# Patient Record
Sex: FEMALE | Race: WHITE | Employment: OTHER | URBAN - METROPOLITAN AREA
[De-identification: names, ages, dates, MRNs, and addresses within clinical notes are randomized per-mention and may not be internally consistent; named-entity substitution may affect disease eponyms.]

---

## 2024-05-28 NOTE — TELEPHONE ENCOUNTER
Patient calling to initiate prenatal care  LMP 04/18  Patient is 7-8 weeks on 06/13  Confirmation Ultrasound and Appointment scheduled on   Future Appointments   Date Time Provider Department Center   6/18/2024  2:45 PM [facility] [place] [facility] [place]   6/18/2024  3:15 PM Manjit Pabon MD [facility] [place]   7/11/2024 11:30 AM Luiza Marti MD [facility] [place]         Any history of ectopic pregnancy? yes  Any history of miscarriage? no  Any medications that you are taking on a regular basis other than prenatal vitamins? no (if not taking prenatal vitamins, encourage patient to start taking.)  Any bleeding since the first day of last LMP and your positive pregnancy test? no    Insurance [facility]

## 2024-05-29 NOTE — TELEPHONE ENCOUNTER
Called to follow up with patient x2 with Interpretor ID [id number]  No option for voicemail     Patient with hx of ectopic pregnancy. Per Dr. Zhao patient needs appointment asap to establish care with provider team and order HCG testing. Can keep current 7/8 week  appt.     Patient needs MyChart activation and 15-30 min appt to establish care.

## 2024-05-29 NOTE — TELEPHONE ENCOUNTER
Called patient with interpretor ID [id number]    Patient denies hx of ectopic pregnancy. Patient states has a history of two term pregnancies, no hx of miscarriage or ectopic, no previous abnormal pregnancies.     Has started taking her prenatal, and is not currently on any medications.     [employer] activation text sent to patient, verbalizes understanding to setup account.     No further questions or concerns. No changes made to prenatal appointments.

## 2024-05-29 NOTE — TELEPHONE ENCOUNTER
Call back attempted with interpretor ID [id number]  Able to reach patient's spouse, he instructed this RN to try to call patient again in 10-15 minutes.

## 2024-06-18 NOTE — PROGRESS NOTES
[facility]  Obstetrics and Gynecology    Subjective:     Sandra Bryan is a 39 year old  female presents with c/o secondary amenorrhea and positive pregnancy test. The patient was recommended to return for further evaluation. The patient reports doing welle. Patient's last menstrual period was 2024 (exact date)..     Review of Systems:  General:  denies fevers, chills, fatigue and malaise.   Respiratory: denies SOB, dyspnea, cough or wheezing  Cardiovascular:  denies chest pain, palpitations, exercise intolerance   GI: denies abdominal pain, diarrhea, constipation  :  denies dysuria, hematuria, increased urinary frequency.  denies abnormal uterine bleeding or vaginal discharge.     OB History:   OB History    Para Term  AB Living   3 2 2 0 0 2   SAB IAB Ectopic Multiple Live Births   0 0 0 1 2      # Outcome Date GA Lbr Nixon/2nd Weight Sex Type Anes PTL Lv   3 Current            2 Term  41w0d   M Caesarean      1A Para     M Caesarean   ORLY   1B Term  38w0d   F Caesarean   ORLY       Gyne History:  Menarche: 11  Period Cycle (Days): Pregnant  Period Duration (Days): N/A  Period Flow: N/A  Hx Prior Abnormal Pap: No  Pap Date:  (Per pt 3 yrs ago)  Pap Result Notes: WNL  Patient's last menstrual period was 2024 (exact date).      Meds:  Current Outpatient Medications on File Prior to Visit   Medication Sig Dispense Refill    Cholecalciferol (VITAMIN D3) 1.25 MG (86246 UT) Oral Cap TAKE 1 CAPSULE BY MOUTH ONCE PER WEEK FOR 12 WEEKS.      Prenatal Vit-Fe Fumarate-FA (PRENATAL VITAMINS OR) Take by mouth.       No current facility-administered medications on file prior to visit.       All:  No Known Allergies    PMH:  History reviewed. No pertinent past medical history.    PSH:  Past Surgical History:   Procedure Laterality Date          Hand surgery Left        Objective:     Vitals:    24 1514   BP: 116/66   Pulse: 108   Weight: 202 lb 9.6 oz (91.9 kg)         There  is no height or weight on file to calculate BMI.    General: AAO.NAD.   CVS exam: normal peripheral perfusion  Chest: non-labored breathing, no tachypnea   Abdominal exam: deferred   Pelvic exam: deferred        Summary of Ultrasound Findings:  First Trimester US   GA by LMP: 8+5   GA by US: 8+6   FHT: 163 bpm   Impression: Single live IUP. Early viable. Gestational sac, yolk sac and fetal pole seen. Both ovaries wnl. No free fluid. Cardiac activity noted. Cervix appears closed and wnl.   WILFREDO 2025 by LMP c/w today's ultrasound       Assessment:     Sandra Bryan is a 39 year old  female who presents for secondary amenorrhea and positive pregnancy test    Patient Active Problem List    Diagnosis    History of      X2  [ ] repeat       Advanced maternal age in multigravida (HCC)     [ ] MFM consult  [ ] NIPT             Plan:     Secondary amenorrhea  - a/w positive pregnancy test  - US noted for  viable IUP at 8+5   - Patient counseled regarding fish consumption, avoidance of exposure to cat feces/raw meat/raw seafood/unpasteurized dairy/unheated lunch meat. Discussed coffee consumption of up to 200mg of caffeine. Recommended avoidance of alcohol, tobacco, drugs and high risk sexual partners.   - Counseled on taking a PNV with at least 0.4mg of folic acid   - genetic screening testing d/w patient and family, pt declines invasive diagnostic testing and considering first versus second trimester screening   - advised to follow up to establish prenatal care   - SAB precautions provided   - d/w nausea and vomiting in pregnancy including vitamin B 6 and unisom   - COVID 19 precautions provided, recommend vaccination and booster if/when applicable     All of the findings and plan were discussed with the patient.  She notes understanding and agrees with the plan of care.  All questions were answered to the best of my ability at this time.      Total patient time was 15 minutes in evaluation,  consultation, and coordination of care. This included face to face and non-face to face actions. The patient's questions and concerns that were addressed.     RTC in 4 weeks or sooner if needed     Manjit Pabon MD      Discussed with patient that there will not be further notification of normal or benign results other than receiving results on mychart. A [employer] message or telephone call will be placed by the physician and/or office staff if results are abnormal.         Note to patient and family   The 21st Century Cures Act makes medical notes available to patients in the interest of transparency.  However, please be advised that this is a medical document.  It is intended as [id number] communication.  It is written and medical language may contain abbreviations or verbiage that are technical and unfamiliar.  It may appear blunt or direct.  Medical documents are intended to carry relevant information, facts as evident, and the clinical opinion of the practitioner.      This note could include assistance by Dragon voice recognition. Errors in content may be related to improper recognition by the system; efforts to review and correct have been done but errors may still exist.

## 2024-06-19 PROBLEM — Z98.891 HISTORY OF C-SECTION: Status: ACTIVE | Noted: 2024-06-19

## 2024-06-19 PROBLEM — O09.529 ADVANCED MATERNAL AGE IN MULTIGRAVIDA (HCC): Status: ACTIVE | Noted: 2024-06-19

## 2024-07-08 NOTE — TELEPHONE ENCOUNTER
Patient's spouse, Vel, called in regarding order that dr gave patient for DNA testing and other exams that patient wanted to have performed.  The spouse called insurance to see if they would cover the testing and exams and was advised that CPT codes are needed.  Please contact him back to advise-advised spouse to have patient update verbal consent on MY Chart to have info released to him, or we can call patient at [phone] to advise, but patient will need .

## 2024-07-09 NOTE — TELEPHONE ENCOUNTER
Spoke to Vel with patient also present on phone call.  Panorama cell free DNA test desired but Vel would like to confirm insurance coverage.  Provided Vel with [employer] phone number, [phone], to discuss cost estimate and coverage options for Panorama Base Panel.  Explained that Debra will check insurance coverage and offer a discounted cash price if insurance will not cover or will be more expensive.  Instructed Vel to call back if the testing is desired so that a nurse can place the orders and have the kit shipped to their home.  Understanding was verbalized.

## 2024-07-15 NOTE — TELEPHONE ENCOUNTER
WILFREDO 01/23/2025 - currently 12w4d   Patient desires NIPT testing- would like gender reported.    Order placed for [facility].    Patient aware that Debra will send her a link to schedule mobile phlebotomy and ship a kit to her home.    Order Number: [id number]

## 2024-07-17 PROBLEM — O99.210 OBESITY AFFECTING PREGNANCY, ANTEPARTUM (HCC): Status: ACTIVE | Noted: 2024-07-17

## 2024-07-17 PROBLEM — Z34.80 SUPERVISION OF OTHER NORMAL PREGNANCY, ANTEPARTUM (HCC): Status: ACTIVE | Noted: 2024-07-17

## 2024-07-17 PROCEDURE — 87624 HPV HI-RISK TYP POOLED RSLT: CPT | Performed by: OBSTETRICS & GYNECOLOGY

## 2024-07-17 NOTE — PROGRESS NOTES
[facility]  Obstetrics and Gynecology  History & Physical    CC: Patient is here to establish prenatal care     Subjective:     HPI:  Sandra Bryan is a 39 year old  female at 12w6d who presents today to establish prenatal care. Patient reports doing well. Denies vaginal bleeding, abdominal/pelvic pain, nausea and vomiting.     [employer] interpretor: ID [id number] - used for entire visit     LMP: Patient's last menstrual period was 2024 (exact date).  WILFREDO:  Estimated Date of Delivery: 25    OB:  OB History    Para Term  AB Living   3 2 2 0 0 2   SAB IAB Ectopic Multiple Live Births   0 0 0 1 2      # Outcome Date GA Lbr Nixon/2nd Weight Sex Type Anes PTL Lv   3 Current            2 Term  41w0d   M Caesarean      1A Para     M Caesarean   ORLY   1B Term  38w0d   F Caesarean   ORLY         Pregnancy planned?: yes  Pregnancy desired? yes    GYN:   Menarche: 11  Period Cycle (Days): Pregnant  Period Duration (Days): N/A  Period Flow: N/A  Hx Prior Abnormal Pap: No  Pap Date:  (Per pt last pap was in )  Pap Result Notes: WNL    STD hx- denies.  Pap hx- denies abnormal.     PMH: History reviewed. No pertinent past medical history.    PSH:    Past Surgical History:   Procedure Laterality Date          Hand surgery Left        MEDS:  Current Outpatient Medications on File Prior to Visit   Medication Sig Dispense Refill    Cholecalciferol (VITAMIN D3) 1.25 MG (41455 UT) Oral Cap TAKE 1 CAPSULE BY MOUTH ONCE PER WEEK FOR 12 WEEKS.      Prenatal Vit-Fe Fumarate-FA (PRENATAL VITAMINS OR) Take by mouth.       No current facility-administered medications on file prior to visit.       Allergies:    No Known Allergies  denies latex.     Immunizations:    There is no immunization history on file for this patient.    Family Hx:    History reviewed. No pertinent family history.    SocialHx:        Social History     Socioeconomic History    Marital status:    Tobacco Use    Smoking  status: Never    Smokeless tobacco: Never   Vaping Use    Vaping status: Never Used   Substance and Sexual Activity    Alcohol use: Not Currently    Drug use: Never    Sexual activity: Yes   Other Topics Concern    Caffeine Concern No    Exercise No    Seat Belt Yes     Social Determinants of Health     Financial Resource Strain: Low Risk  (3/7/2024)    Received from [facility]    Overall Financial Resource Strain (CARDIA)     Difficulty of Paying Living Expenses: Not hard at all   Food Insecurity: No Food Insecurity (3/7/2024)    Received from [facility]    Hunger Vital Sign     Worried About Running Out of Food in the Last Year: Never true     Ran Out of Food in the Last Year: Never true   Transportation Needs: No Transportation Needs (3/7/2024)    Received from [facility]    PRAPARE - Transportation     Lack of Transportation (Medical): No     Lack of Transportation (Non-Medical): No   Stress: Stress Concern Present (3/7/2024)    Received from [facility]    [employer] [place] [employer] - Occupational Stress Questionnaire     Feeling of Stress : Very much   Housing Stability: Low Risk  (3/7/2024)    Received from [facility]    Housing Stability Vital Sign     Unable to Pay for Housing in the Last Year: No     Number of Places Lived in the Last Year: 1     Unstable Housing in the Last Year: No       Patient feels unsafe or threatened?: denies       Health maintenance:  Mammogram (age 40 and q1-2yr):  N/A  Colonoscopy (age 50 and q10yr):  N/A    Review of Systems:  General: no complaints per category. See HPI for additional information.   Breast: no complaints per category. See HPI for additional information.   Respiratory: no complaints per category. See HPI for additional information.   Cardiovascular: no complaints per category. See HPI for additional information.   GI: no complaints  per category. See HPI for additional information.   : no complaints per category. See HPI for additional information.   Heme: no complaints per category. See HPI for additional information.     Objective:     PE:  Vitals: /70   Pulse 101   Ht 63\"   Wt 200 lb (90.7 kg)   LMP 2024 (Exact Date)   BMI 35.43 kg/m²   Gen: A/O, NAD  Head/Neck: neck supple, thyroid non-enlarged, symmetric and without nodules  Breasts: breasts symmetric, no dominant or suspicious mass, no skin or nipple changes and no axillary adenopathy  CV: normal peripheral perfusion   Lungs: no tachypnea, non-labored breathing   Abdominal exam: soft, nontender, nondistended  Ext: non-tender, no edema  :  1) External Genitalia -  Normal appearing skin and hair distribution, no visible lesions.   2) Vagina:  mucosa appears pink, and well rugated.   3) Cervix:  smooth, with no visible lesions, erosions, or scars, os closed. No bleeding noted.   4) Uterus: Anteverted. Mobile. 12 week gestational size.   5) Adnexa/parametria: Non tender adnexa. No masses.     Fetal Well Being Assessment:     BPM    Assessment/Plan:     Sandra Bryan is a 39 year old  female at 12w6d who presents today to establish prenatal care.    Patient Active Problem List    Diagnosis    Supervision of other normal pregnancy, antepartum (HCC)    Obesity affecting pregnancy, antepartum (HCC)     [ ] early 1 hr GTT       History of      X2  [ ] repeat       Advanced maternal age in multigravida (HCC)     Low dose ASA  [ ] MFM consult  [ ] NIPT           Prenatal care  - prenatal labs ordered  - Pap:  collected and ordered   - Cervical cultures: GC, Chl collected and ordered   - continue PNV daily given        Genetic Screening tests  - Discussion held with patient about genetic screening. Discussion including first trimester versus second trimester screening. Patient offered Amniocentesis and declined. Pt requested NIPT and ordered. Patient  has kit today.   - Cystic fibrosis/SMA/hemoglobinopathy carrier screening: offered and patient will consider, advised to notify office if she desires order        Patient education  - Pt counseled on safety, diet, exercise, caffiene, tobacco, ETOH, sexual activity, ER precautions, diet, exercise, appropriate weight gain, travel, appropriate otc medication use, substance abuse and pets.  - Counseled on taking a PNV with 0.4mg of folic acid   - Limiting intake of alcohol, coffee, tea, soda, and other foods containing caffeine  - Limit intake of fish to twice weekly to limit mercury exposure  - COVID 19 precautions provided    - Pregnancy BMI guidelines: Prepregnancy weight: 202 lbs. Weight today 200 lbs. BMI 35. Expected Weigh gain 11-20 lbs. TWG -2 lbs.          RTC in 4 wks     Shari Zhao MD   [facility] - OBGYN    Discussed with patient that there will not be further notification of normal or benign results other than receiving results on [employer]IV[employer]. A [employer] message or telephone call will be placed by the physician and/or office staff if results are abnormal.     Note to patient and family   The 21st Century Cures Act makes medical notes available to patients in the interest of transparency.  However, please be advised that this is a medical document.  It is intended as [id number]-qq-[id number] communication.  It is written and medical language may contain abbreviations or verbiage that are technical and unfamiliar.  It may appear blunt or direct.  Medical documents are intended to carry relevant information, facts as evident, and the clinical opinion of the practitioner.

## 2024-07-19 PROBLEM — O23.40 URINARY TRACT INFECTION IN MOTHER DURING PREGNANCY, ANTEPARTUM (HCC): Status: ACTIVE | Noted: 2024-07-19

## 2024-07-22 NOTE — TELEPHONE ENCOUNTER
From: Sandra Bryan  To: Shari Zhao  Sent: 7/22/2024 8:21 AM CDT  Subject: Ultrasound     Hi Dr. Zhao, I hope that you are fine.    I have a question about the ultrasound, my first ultrasound was with 9 weeks and the next will be with 20 (high risk). Can you please confirm if should I schedule any other in this meantime?     Thanks

## 2024-07-22 NOTE — PROGRESS NOTES
Called and no  answer, was unable to leave a vm for pt. , vm not set up either will try later.  Looks like needs a Filipino

## 2024-07-27 ENCOUNTER — LAB ENCOUNTER (OUTPATIENT)
Dept: LAB | Facility: HOSPITAL | Age: 39
End: 2024-07-27
Attending: OBSTETRICS & GYNECOLOGY
Payer: COMMERCIAL

## 2024-07-27 DIAGNOSIS — O99.210 OBESITY AFFECTING PREGNANCY, ANTEPARTUM, UNSPECIFIED OBESITY TYPE (HCC): ICD-10-CM

## 2024-07-27 DIAGNOSIS — Z34.80 SUPERVISION OF OTHER NORMAL PREGNANCY, ANTEPARTUM (HCC): ICD-10-CM

## 2024-07-27 LAB
ANTIBODY SCREEN: NEGATIVE
BASOPHILS # BLD AUTO: 0.02 X10(3) UL (ref 0–0.2)
BASOPHILS NFR BLD AUTO: 0.2 %
EOSINOPHIL # BLD AUTO: 0.15 X10(3) UL (ref 0–0.7)
EOSINOPHIL NFR BLD AUTO: 1.2 %
ERYTHROCYTE [DISTWIDTH] IN BLOOD BY AUTOMATED COUNT: 14 %
GLUCOSE 1H P GLC SERPL-MCNC: 150 MG/DL
HBV SURFACE AG SER-ACNC: 0.18 [IU]/L
HBV SURFACE AG SERPL QL IA: NONREACTIVE
HCT VFR BLD AUTO: 35.2 %
HCV AB SERPL QL IA: NONREACTIVE
HGB BLD-MCNC: 12 G/DL
IMM GRANULOCYTES # BLD AUTO: 0.1 X10(3) UL (ref 0–1)
IMM GRANULOCYTES NFR BLD: 0.8 %
LYMPHOCYTES # BLD AUTO: 2.06 X10(3) UL (ref 1–4)
LYMPHOCYTES NFR BLD AUTO: 16.4 %
MCH RBC QN AUTO: 27.2 PG (ref 26–34)
MCHC RBC AUTO-ENTMCNC: 34.1 G/DL (ref 31–37)
MCV RBC AUTO: 79.8 FL
MONOCYTES # BLD AUTO: 0.51 X10(3) UL (ref 0.1–1)
MONOCYTES NFR BLD AUTO: 4.1 %
NEUTROPHILS # BLD AUTO: 9.74 X10 (3) UL (ref 1.5–7.7)
NEUTROPHILS # BLD AUTO: 9.74 X10(3) UL (ref 1.5–7.7)
NEUTROPHILS NFR BLD AUTO: 77.3 %
PLATELET # BLD AUTO: 285 10(3)UL (ref 150–450)
RBC # BLD AUTO: 4.41 X10(6)UL
RH BLOOD TYPE: POSITIVE
RUBV IGG SER QL: POSITIVE
RUBV IGG SER-ACNC: 36 IU/ML (ref 10–?)
T PALLIDUM AB SER QL IA: NONREACTIVE
WBC # BLD AUTO: 12.6 X10(3) UL (ref 4–11)

## 2024-07-27 PROCEDURE — 87389 HIV-1 AG W/HIV-1&-2 AB AG IA: CPT

## 2024-07-27 PROCEDURE — 85025 COMPLETE CBC W/AUTO DIFF WBC: CPT

## 2024-07-27 PROCEDURE — 86850 RBC ANTIBODY SCREEN: CPT

## 2024-07-27 PROCEDURE — 82950 GLUCOSE TEST: CPT

## 2024-07-27 PROCEDURE — 86901 BLOOD TYPING SEROLOGIC RH(D): CPT

## 2024-07-27 PROCEDURE — 86803 HEPATITIS C AB TEST: CPT

## 2024-07-27 PROCEDURE — 36415 COLL VENOUS BLD VENIPUNCTURE: CPT

## 2024-07-27 PROCEDURE — 86900 BLOOD TYPING SEROLOGIC ABO: CPT

## 2024-07-27 PROCEDURE — 86762 RUBELLA ANTIBODY: CPT

## 2024-07-27 PROCEDURE — 87340 HEPATITIS B SURFACE AG IA: CPT

## 2024-07-27 PROCEDURE — 86780 TREPONEMA PALLIDUM: CPT

## 2024-07-29 DIAGNOSIS — O99.810 ABNORMAL MATERNAL GLUCOSE TOLERANCE, ANTEPARTUM (HCC): Primary | ICD-10-CM

## 2024-07-31 NOTE — PROGRESS NOTES
Patient returned call to office.   Discussed provider review and recommendations.   Instructions for 3 hour glucose testing provided.     All questions answered.   Follow up MyChart sent.

## 2024-07-31 NOTE — PROGRESS NOTES
Called patient x2 with interpretor ID [id number]  No answer, no option for voicemail.     [employer] message sent to patient.

## 2024-08-03 ENCOUNTER — LABORATORY ENCOUNTER (OUTPATIENT)
Dept: LAB | Facility: HOSPITAL | Age: 39
End: 2024-08-03
Attending: OBSTETRICS & GYNECOLOGY
Payer: COMMERCIAL

## 2024-08-03 DIAGNOSIS — O99.810 ABNORMAL MATERNAL GLUCOSE TOLERANCE, ANTEPARTUM (HCC): ICD-10-CM

## 2024-08-03 LAB
GLUCOSE 1H P GLC SERPL-MCNC: 145 MG/DL
GLUCOSE 2H P GLC SERPL-MCNC: 128 MG/DL
GLUCOSE 3H P GLC SERPL-MCNC: 98 MG/DL (ref 70–140)
GLUCOSE P FAST SERPL-MCNC: 84 MG/DL

## 2024-08-03 PROCEDURE — 82951 GLUCOSE TOLERANCE TEST (GTT): CPT

## 2024-08-03 PROCEDURE — 82952 GTT-ADDED SAMPLES: CPT

## 2024-08-03 PROCEDURE — 36415 COLL VENOUS BLD VENIPUNCTURE: CPT

## 2024-08-05 ENCOUNTER — TELEPHONE (OUTPATIENT)
Dept: OBGYN CLINIC | Facility: CLINIC | Age: 39
End: 2024-08-05

## 2024-08-05 DIAGNOSIS — Z13.79 ENCOUNTER FOR GENETIC SCREENING: ICD-10-CM

## 2024-08-05 NOTE — TELEPHONE ENCOUNTER
Received test results from [facility]. Placed in nurse bin in [place] for review

## 2024-08-08 ENCOUNTER — PATIENT MESSAGE (OUTPATIENT)
Dept: OBGYN CLINIC | Facility: CLINIC | Age: 39
End: 2024-08-08

## 2024-08-08 NOTE — TELEPHONE ENCOUNTER
From: Sandra Omer  To: Manjit Pabon  Sent: 8/8/2024 11:22 AM CDT  Subject: Back and forth pain    Hi doctor,     How are you doing?   I have an appointment for August 19, but I have some pain near the baby and comes and go frequently, I’d like to know if it is possible to anticipate the appointment so you can give me some instructions or check why I’m feeling it     Thanks

## 2024-08-08 NOTE — TELEPHONE ENCOUNTER
Called to follow up with patient.   Patient is currently 16w0d with Estimated Date of Delivery: 1/23/25     Pain is on the lower middle abdomen, under belly button. Patient endorses painful urination, no other symptoms present.     Patient did previously have a UTI that was treated with cefpodoxime, states that symptoms did get better temporarily.     Routed to provider for review.

## 2024-08-09 NOTE — TELEPHONE ENCOUNTER
Per Dr. Pabon- patient can go to the lab to complete urine culture and then start on antibiotic.  Provider has placed orders.  Patient notified by MyChart.

## 2024-08-10 ENCOUNTER — LAB ENCOUNTER (OUTPATIENT)
Dept: LAB | Age: 39
End: 2024-08-10
Attending: STUDENT IN AN ORGANIZED HEALTH CARE EDUCATION/TRAINING PROGRAM
Payer: COMMERCIAL

## 2024-08-10 DIAGNOSIS — N30.90 CYSTITIS: ICD-10-CM

## 2024-08-10 PROCEDURE — 87086 URINE CULTURE/COLONY COUNT: CPT

## 2024-08-19 ENCOUNTER — ROUTINE PRENATAL (OUTPATIENT)
Dept: OBGYN CLINIC | Facility: CLINIC | Age: 39
End: 2024-08-19
Payer: COMMERCIAL

## 2024-08-19 VITALS
SYSTOLIC BLOOD PRESSURE: 112 MMHG | HEART RATE: 116 BPM | DIASTOLIC BLOOD PRESSURE: 68 MMHG | BODY MASS INDEX: 36 KG/M2 | WEIGHT: 200.81 LBS

## 2024-08-19 DIAGNOSIS — Z36.8A ENCOUNTER FOR ANTENATAL SCREENING FOR OTHER GENETIC DEFECT (HCC): ICD-10-CM

## 2024-08-19 DIAGNOSIS — O09.529 ANTEPARTUM MULTIGRAVIDA OF ADVANCED MATERNAL AGE (HCC): Primary | ICD-10-CM

## 2024-08-19 DIAGNOSIS — Z34.80 SUPERVISION OF OTHER NORMAL PREGNANCY, ANTEPARTUM (HCC): ICD-10-CM

## 2024-08-19 NOTE — PROGRESS NOTES
KULWINDER  The  service was used for the duration of the visit  FM- none yet  Doing well, no concerns or questions  No complaints. No LOF/VB/uctx  Genetic testing done, she would like the MSAFP and is aware to complete by 21 weeks  Anatomy Scan with MFM at 20 weeks  KULWINDER 4 weeks

## 2024-08-29 NOTE — PROGRESS NOTES
Outpatient Maternal-Fetal Medicine Consultation    Dear Dr. Zhao    Thank you for requesting ultrasound evaluation and maternal fetal medicine consultation on your patient Sandra Omer.  As you are aware she is a 39 year old female  with a singletonpregnancy and an Estimated Date of Delivery: 25.  A maternal-fetal medicine consultation was requested secondary to advanced maternal age.  Her prenatal records and labs were reviewed.    HISTORY  # 1A - Date: None, Sex: Male, Weight: None, GA: None, Type: Caesarean Section, Apgar1: None, Apgar5: None, Living: Living, Birth Comments: None  # 1B - Date: None, Sex: Female, Weight: None, GA: 38w0d, Type: Caesarean Section, Apgar1: None, Apgar5: None, Living: Living, Birth Comments: None    # 2 - Date: None, Sex: Male, Weight: None, GA: 41w0d, Type: Caesarean Section, Apgar1: None, Apgar5: None, Living: None, Birth Comments: None    # 3 - Date: None, Sex: None, Weight: None, GA: None, Type: None, Apgar1: None, Apgar5: None, Living: None, Birth Comments: None    Past Medical History  The patient  has no past medical history on file.    Past Surgical History  The patient  has a past surgical history that includes Hand Surgery (Left) and .    Family History  The patient has no family status information on file.       Medications:   Current Outpatient Medications:     Cholecalciferol (VITAMIN D3) 1.25 MG (81849 UT) Oral Cap, TAKE 1 CAPSULE BY MOUTH ONCE PER WEEK FOR 12 WEEKS., Disp: , Rfl:     Prenatal Vit-Fe Fumarate-FA (PRENATAL VITAMINS OR), Take by mouth., Disp: , Rfl:     cefpodoxime 100 MG Oral Tab, Take 1 tablet (100 mg total) by mouth 2 (two) times daily. (Patient not taking: Reported on 2024), Disp: 10 tablet, Rfl: 0  Allergies: No Known Allergies    PHYSICAL EXAMINATION:  /77 (BP Location: Right arm, Patient Position: Sitting, Cuff Size: adult)   Pulse 100   Ht 5' 3\" (1.6 m)   Wt 200 lb (90.7 kg)   LMP 2024 (Exact  Date)   BMI 35.43 kg/m²   General: alert and oriented,no acute distress  Abdomen: gravid, soft, non-tender  Extremities: non-tender, no edema    OBSTETRIC ULTRASOUND  The patient had a level II ultrasound today which revealed size consistent with dates and a normal detailed anatomic survey.      Ultrasound Findings:  Single IUP in breech presentation.    Placenta is posterior.   A 3 vessel cord is noted.  Cardiac activity is present at 159 bpm   g ( 0 lb 13 oz);   MVP is 4.8 cm .     The fetal measurements are consistent with the established EDC. No ultrasound evidence of structural abnormalities are seen today. The nasal bone is present. No ultrasound evidence of markers for aneuploidy are seen. She understands that ultrasound exam cannot exclude genetic abnormalities and that genetic testing is recommended. The limitations of ultrasound were discussed.     Uterus and adnexa appeared normal  today on US    See PACS/Imaging Tab For Complete Ultrasound Report  I interpreted the results and reviewed them with the patient.    DISCUSSION  During her visit we discussed and reviewed the following issues:  OBESITY  This patient has obesity; her pre-gravid BMI is: 35.8  Obesity during pregnancy is associated with numerous maternal and  risks.  It is not clear whether obesity is a direct cause of adverse pregnancy outcome or whether the association between obesity and adverse pregnancy outcome is due to factors such as diabetes mellitus.   Data suggest that obese women should be encouraged to undertake a weight reduction program (diet, exercise, behavior modification, and possibly bariatric surgery in some cases) prior to attempting to conceive.            Subfertility in obese women is most commonly related to ovulatory dysfunction, and, in some obese women, the ovulatory dysfunction is related to polycystic ovary syndrome (PCOS). It is also important to note that even among ovulatory women, increasing  obesity is associated with decreasing spontaneous pregnancy rates.  The increased risk of miscarriage in obese women may be because such women often have PCOS or isolated insulin resistance.                 Due to its strong association with obesity in the general population, type 2 diabetes mellitus is one of the two most common medical complications of the obese . The increased risk of type 2 diabetes is primarily related to an exaggerated increase in insulin resistance in the obese state. It is reasonable to screen obese gravidas for undiagnosed pregestational diabetes in the first trimester.   Glucose intolerance associated with gestational diabetes generally resolves postpartum; however, obese women with a history of gestational diabetes have a two-fold increased prevalence of subsequent type 2 diabetes.           An association between obesity and hypertensive disorders during pregnancy has been consistently reported.  In particular, maternal weight and BMI are independent risk factors for preeclampsia.             Studies have found that the increased risk of  birth in obese gravidas is primarily associated with obesity-related medical and  complications, rather than an intrinsic predisposition to spontaneous  birth. Prevention of  birth in these patients, therefore, should be directed toward prevention or management of medical and obstetrical complications.               Both prepregnancy obesity and excessive maternal weight gain before or during pregnancy contribute to an increased probability of  delivery.  It has also been hypothesized that obesity may lead to dystocia due to increased soft tissue deposition in the maternal pelvis.    delivery in the obese  is associated with numerous perioperative concerns, including emergency delivery, prolonged incision to delivery interval, blood loss >1000 mL, longer operative times, wound infection,  thromboembolism, and endometritis.            Maternal obesity appears to be associated with a small increase in the absolute rate of some congenital anomalies, and the risk may increase with increasing maternal weight.  The risk of neural tube defects increased significantly with maternal weight.    The analysis found that overweight and obese pregnant women experienced significantly more stillbirths than normal weight women.      Increased  testing and Level 2 Ultrasound is recommended.     ADVANCED MATERNAL AGE    Background  I reviewed with the patient that pregnancies in women of advanced maternal age (35 or older at delivery) are associated with elevated risks.  Specifically, there is a higher rate of:    Fetal malformations  Preeclampsia  Gestational diabetes  Intrauterine fetal death    As a result, enhanced pregnancy surveillance is advised for these patients including a comprehensive ultrasound to assess for fetal malformations  (at 20 weeks) and a third trimester ultrasound assessment for fetal growth (at 32 weeks).  In addition, weekly NST's (initiating at 36 weeks gestation for women 35-39 years and at 32 weeks gestation for women 40 years and older) are also advised.  Routine obstetric care is more than adequate to assess for gestational diabetes and preeclampsia; hence, no further significant alterations in obstetric care are advised.    Fetal Aneuploidy    We also discussed the increased risk of chromosomal abnormalities associated with advanced maternal age.  I reviewed that an ultrasound examination cannot reliably exclude potential genetic abnormalities. Given that the patient will be 39 years old at the time of delivery I reviewed that her risk (at delivery) of having a  with any chromosome abnormality is 1:83 and with trisomy 21 is 1: 136.    Invasive Testing    I offered invasive genetic testing (amniocentesis, chorionic villus sampling) after reviewing the diagnostic accuracy  of these tests as well as the procedure associated loss rate (1:500 for genetic amniocentesis).    She ultimately does not desire invasive genetic testing.     Non-invasive Pregnancy Testing (NIPT)    I reviewed current non-invasive screening options.  Currently non-invasive pregnancy testing (NIPT) offers the highest detection rate (with the lowest false positive rate) for the detection of fetal aneuploidy amongst high-risk patients.  The limitations of detailed mid-trimester sonography was reviewed with the patient.  First trimester screening and second trimester multiple-marker serum serum screening as alternative aneuploidy screening options were also reviewed.  However, both of these tests are associated with lower detection and higher false positive rates.    The patient has already obtained a low-risk  NPIT result and was appropriately reassured.     IMPRESSION:  IUP at 20w0d  AMA: low-risk cell free fetal DNA, declined invasive testing  Obesity    RECOMMENDATIONS:  Continue care with Dr. Zhao  Follow-up growth & BPP ultrasound at 32 weeks gestation  Weekly NST's at 36 weeks.  Limit weight gain to 11-20 pounds.    Thank you for allowing me to participate in the care of your patient.  Please do not hesitate to contact me if additional questions or concerns arise.      Costa Caceres M.D.    40 minutes spent in review of records, patient consultation, documentation and coordination of care.  The relevant clinical matter(s) are summarized above.     Note to patient and family  The 21st Century Cures Act makes medical notes available to patients in the interest of transparency.  However, please be advised that this is a medical document.  It is intended as mjtp-ee-qzgt communication.  It is written and medical language may contain abbreviations or verbiage that are technical and unfamiliar.  It may appear blunt or direct.  Medical documents are intended to carry relevant information, facts as evident, and the  clinical opinion of the practitioner.

## 2024-09-05 ENCOUNTER — OFFICE VISIT (OUTPATIENT)
Dept: PERINATAL CARE | Facility: HOSPITAL | Age: 39
End: 2024-09-05
Attending: OBSTETRICS & GYNECOLOGY
Payer: COMMERCIAL

## 2024-09-05 VITALS
HEART RATE: 100 BPM | HEIGHT: 63 IN | DIASTOLIC BLOOD PRESSURE: 77 MMHG | WEIGHT: 200 LBS | SYSTOLIC BLOOD PRESSURE: 120 MMHG | BODY MASS INDEX: 35.44 KG/M2

## 2024-09-05 DIAGNOSIS — O09.529 ANTEPARTUM MULTIGRAVIDA OF ADVANCED MATERNAL AGE (HCC): ICD-10-CM

## 2024-09-05 DIAGNOSIS — O99.212 OTHER OBESITY AFFECTING PREGNANCY IN SECOND TRIMESTER (HCC): ICD-10-CM

## 2024-09-05 DIAGNOSIS — O09.529 ADVANCED MATERNAL AGE IN MULTIGRAVIDA (HCC): ICD-10-CM

## 2024-09-05 DIAGNOSIS — O09.529 ADVANCED MATERNAL AGE IN MULTIGRAVIDA (HCC): Primary | ICD-10-CM

## 2024-09-05 DIAGNOSIS — E66.8 OTHER OBESITY AFFECTING PREGNANCY IN SECOND TRIMESTER (HCC): ICD-10-CM

## 2024-09-05 PROBLEM — O99.210 OBESITY COMPLICATING PREGNANCY (HCC): Status: ACTIVE | Noted: 2024-07-17

## 2024-09-05 PROCEDURE — 76811 OB US DETAILED SNGL FETUS: CPT | Performed by: OBSTETRICS & GYNECOLOGY

## 2024-09-16 ENCOUNTER — LAB ENCOUNTER (OUTPATIENT)
Dept: LAB | Facility: HOSPITAL | Age: 39
End: 2024-09-16
Attending: NURSE PRACTITIONER
Payer: COMMERCIAL

## 2024-09-16 ENCOUNTER — ROUTINE PRENATAL (OUTPATIENT)
Dept: OBGYN CLINIC | Facility: CLINIC | Age: 39
End: 2024-09-16
Payer: COMMERCIAL

## 2024-09-16 VITALS
DIASTOLIC BLOOD PRESSURE: 72 MMHG | SYSTOLIC BLOOD PRESSURE: 114 MMHG | HEART RATE: 118 BPM | WEIGHT: 203.63 LBS | BODY MASS INDEX: 36 KG/M2

## 2024-09-16 DIAGNOSIS — Z34.80 SUPERVISION OF OTHER NORMAL PREGNANCY, ANTEPARTUM (HCC): Primary | ICD-10-CM

## 2024-09-16 DIAGNOSIS — Z36.8A ENCOUNTER FOR ANTENATAL SCREENING FOR OTHER GENETIC DEFECT (HCC): ICD-10-CM

## 2024-09-16 PROCEDURE — 82105 ALPHA-FETOPROTEIN SERUM: CPT

## 2024-09-16 PROCEDURE — 36415 COLL VENOUS BLD VENIPUNCTURE: CPT

## 2024-09-16 NOTE — PROGRESS NOTES
KULWINDER  Lithuanian  was used for the duration of the visit  Doing well, no concerns or questions  Had a normal Level 2 US with MFM  FM is good  RH positive  1 hr glucose/ CBC ordered- discussed 24-28 weeks  She will do the MS AFP today  RTC 4 wks

## 2024-09-19 LAB
AFP MOM: 1.74
AFP VALUE: 55 NG/ML
GA ON COLL DATE: 17 WEEKS
INSULIN DEP AFP: NO
MAT AGE AT EDD: 40 YR
MULTIPLE GEST AFP: NO
OSBR RISK 1 IN AFP: 1477
WEIGHT AFP: 200 LBS

## 2024-09-19 NOTE — PROGRESS NOTES
Called to follow up with patient using Lao interpretor ID [id number]  No answer, no option for voicemail.     Spouse only approved for general information on verbal release.   Will attempt call back at a later time.

## 2024-09-23 NOTE — PROGRESS NOTES
Called and spoke with pt. But does not understand English, but she wanted her Son Mychal to interpret for her .with her permission.  Reviewed test results with pt. and / son verbalizes understanding of information,

## 2024-10-12 ENCOUNTER — LAB ENCOUNTER (OUTPATIENT)
Dept: LAB | Facility: REFERENCE LAB | Age: 39
End: 2024-10-12
Attending: NURSE PRACTITIONER
Payer: COMMERCIAL

## 2024-10-12 DIAGNOSIS — Z34.80 SUPERVISION OF OTHER NORMAL PREGNANCY, ANTEPARTUM (HCC): ICD-10-CM

## 2024-10-12 LAB — GLUCOSE 1H P GLC SERPL-MCNC: 176 MG/DL

## 2024-10-12 PROCEDURE — 82950 GLUCOSE TEST: CPT

## 2024-10-12 PROCEDURE — 36415 COLL VENOUS BLD VENIPUNCTURE: CPT

## 2024-10-15 ENCOUNTER — ROUTINE PRENATAL (OUTPATIENT)
Dept: OBGYN CLINIC | Facility: CLINIC | Age: 39
End: 2024-10-15
Payer: COMMERCIAL

## 2024-10-15 VITALS
DIASTOLIC BLOOD PRESSURE: 72 MMHG | WEIGHT: 206.19 LBS | SYSTOLIC BLOOD PRESSURE: 112 MMHG | HEART RATE: 99 BPM | BODY MASS INDEX: 36.54 KG/M2 | HEIGHT: 63 IN

## 2024-10-15 DIAGNOSIS — Z3A.25 25 WEEKS GESTATION OF PREGNANCY (HCC): Primary | ICD-10-CM

## 2024-10-15 NOTE — PROGRESS NOTES
Return OB Visit 20-27 WGA      GA: 25w5d  Vitals:    10/15/24 1135   BP: 112/72   Pulse: 99   Weight: 206 lb 3.2 oz (93.5 kg)   Height: 63\"       Doing well.  Denies LOF/VB/uctx. +FM.   RH positive  S/P Anatomy scan   CBC and 3 hr GTT already  ordered      Patient Active Problem List    Diagnosis    Abnormal maternal glucose tolerance, antepartum (HCC)     [X] abnormal early 1 hr GTT  [x ] 3 hr GTT- Nl    Elevated regular 1 hour  on 10/13/2024- 3 hour GTT ordered      Urinary tract infection in mother during pregnancy, antepartum ([facility])     Klebsiella --> oral antibiotics   [x] repeat urine culture was normal      Supervision of other normal pregnancy, antepartum ([facility])    Obesity complicating pregnancy ([facility])     [x] early 1 hr GTT- elevated but normal early 3 hour GTT      History of      X2  [ ] repeat       Advanced maternal age in multigravida (HCC)     Low dose ASA  [ x] MFM consult  Follow-up growth & BPP ultrasound at 32 weeks gestation  Weekly NST's at 36 weeks.  Limit weight gain to 11-20 pounds.  [X ] NIPT wnl            RTC q4wks        Note to patient and family   The  Century Cures Act makes medical notes available to patients in the interest of transparency.  However, please be advised that this is a medical document.  It is intended as xcmp-hh-rzkf communication.  It is written and medical language may contain abbreviations or verbiage that are technical and unfamiliar.  It may appear blunt or direct.  Medical documents are intended to carry relevant information, facts as evident, and the clinical opinion of the practitioner.    Manjit Pabon MD

## 2024-10-15 NOTE — PROGRESS NOTES
Called and spoke with pt. Review test results and recommendations from [facility].  Reviewed instructions for 3 hour GTT and pt. Verbalizes understanding of information and agrees to plan.

## 2024-10-16 ENCOUNTER — TELEPHONE (OUTPATIENT)
Dept: OBGYN CLINIC | Facility: CLINIC | Age: 39
End: 2024-10-16

## 2024-10-16 NOTE — TELEPHONE ENCOUNTER
----- Message from Manjit Pabon sent at 10/15/2024 12:52 PM CDT -----  CS Request: 2nd LANZA please for assist  Request CS at/between this GA: 39 wks  Estimated Date of Delivery: 1/23/25  Indication for CS: repeat  OB history/complications: AMA, obesity, hx of CS x2

## 2024-10-19 ENCOUNTER — LABORATORY ENCOUNTER (OUTPATIENT)
Dept: LAB | Facility: HOSPITAL | Age: 39
End: 2024-10-19
Attending: NURSE PRACTITIONER
Payer: COMMERCIAL

## 2024-10-19 DIAGNOSIS — Z34.80 SUPERVISION OF OTHER NORMAL PREGNANCY, ANTEPARTUM (HCC): ICD-10-CM

## 2024-10-19 DIAGNOSIS — R73.09 ELEVATED GLUCOSE TOLERANCE TEST: ICD-10-CM

## 2024-10-19 LAB
BASOPHILS # BLD AUTO: 0.03 X10(3) UL (ref 0–0.2)
BASOPHILS NFR BLD AUTO: 0.2 %
EOSINOPHIL # BLD AUTO: 0.14 X10(3) UL (ref 0–0.7)
EOSINOPHIL NFR BLD AUTO: 1.1 %
ERYTHROCYTE [DISTWIDTH] IN BLOOD BY AUTOMATED COUNT: 14.1 %
GLUCOSE 1H P GLC SERPL-MCNC: 181 MG/DL
GLUCOSE 2H P GLC SERPL-MCNC: 125 MG/DL
GLUCOSE 3H P GLC SERPL-MCNC: 135 MG/DL (ref 70–140)
GLUCOSE P FAST SERPL-MCNC: 96 MG/DL
HCT VFR BLD AUTO: 35.5 %
HGB BLD-MCNC: 11.3 G/DL
IMM GRANULOCYTES # BLD AUTO: 0.07 X10(3) UL (ref 0–1)
IMM GRANULOCYTES NFR BLD: 0.6 %
LYMPHOCYTES # BLD AUTO: 1.93 X10(3) UL (ref 1–4)
LYMPHOCYTES NFR BLD AUTO: 15.3 %
MCH RBC QN AUTO: 26.5 PG (ref 26–34)
MCHC RBC AUTO-ENTMCNC: 31.8 G/DL (ref 31–37)
MCV RBC AUTO: 83.1 FL
MONOCYTES # BLD AUTO: 0.71 X10(3) UL (ref 0.1–1)
MONOCYTES NFR BLD AUTO: 5.6 %
NEUTROPHILS # BLD AUTO: 9.71 X10 (3) UL (ref 1.5–7.7)
NEUTROPHILS # BLD AUTO: 9.71 X10(3) UL (ref 1.5–7.7)
NEUTROPHILS NFR BLD AUTO: 77.2 %
PLATELET # BLD AUTO: 315 10(3)UL (ref 150–450)
RBC # BLD AUTO: 4.27 X10(6)UL
WBC # BLD AUTO: 12.6 X10(3) UL (ref 4–11)

## 2024-10-19 PROCEDURE — 82951 GLUCOSE TOLERANCE TEST (GTT): CPT

## 2024-10-19 PROCEDURE — 36415 COLL VENOUS BLD VENIPUNCTURE: CPT

## 2024-10-19 PROCEDURE — 82952 GTT-ADDED SAMPLES: CPT

## 2024-10-19 PROCEDURE — 85025 COMPLETE CBC W/AUTO DIFF WBC: CPT

## 2024-10-21 ENCOUNTER — PATIENT MESSAGE (OUTPATIENT)
Dept: OBGYN CLINIC | Facility: CLINIC | Age: 39
End: 2024-10-21

## 2024-10-21 PROBLEM — O24.410 DIET CONTROLLED GESTATIONAL DIABETES MELLITUS (GDM), ANTEPARTUM (HCC): Status: ACTIVE | Noted: 2024-10-21

## 2024-10-22 PROBLEM — O24.415 GESTATIONAL DIABETES MELLITUS (GDM) IN THIRD TRIMESTER CONTROLLED ON ORAL HYPOGLYCEMIC DRUG (HCC): Status: RESOLVED | Noted: 2024-10-22 | Resolved: 2024-10-22

## 2024-10-22 PROBLEM — O24.415 GESTATIONAL DIABETES MELLITUS (GDM) IN THIRD TRIMESTER CONTROLLED ON ORAL HYPOGLYCEMIC DRUG (HCC): Status: ACTIVE | Noted: 2024-10-22

## 2024-10-22 NOTE — PROGRESS NOTES
Called and spoke with pt. and  who interpreted for pt. about needing Diabetes Educator. And that pt. Is GDM according to her recent test results and recommendations from [facility] to see educator.     Pended order for Diabetes Education, please sign pended order

## 2024-10-22 NOTE — TELEPHONE ENCOUNTER
Called and spoke with pt.      GA 26 weeks 5 days  patient complaining of daily headaches for the last week , low BP,  Last OV: 10/15/24 KULWINDER Pabon   Pregnancy Complications: GDM,AMA, Obesity  Abdominal pain: no  Leaking of fluid: no  Vaginal Bleeding: no  Fetal Movement: +      Pt. Said she has had a headache for the lst week, has not tried taking any kind of tylenol for it, said she is taking 2 lose dose ASA daily, did not think she soul take, so encouraged her to take now..along with pushing fluids eating a good meal and taking a nap to see if this helps her at all..started taking bp yesterday because she felt weak and not well.... said they have a machine from [place] and not sure if accurate..looks like it was 105/96..Advised him to get a new one today so pt. Can take her BP's and be confident that readings are correct.  Rating her headaches '4' .  No sick contacts lately, really encouraged her to rest and drink her fluids. And take Tylenol for now.  Any recommendations,  Thanks

## 2024-10-23 NOTE — TELEPHONE ENCOUNTER
Manjit Pabon MD to [facility] Nurse       10/22/24  5:48 PM  Second trimester is when BP reaches a noemi. She should consume electrolyte rich fluid like liquid IV/propel/gatorade (keeping in mind her glucose). She can also take salt tablets over the counter.

## 2024-11-04 ENCOUNTER — ROUTINE PRENATAL (OUTPATIENT)
Dept: OBGYN CLINIC | Facility: CLINIC | Age: 39
End: 2024-11-04
Payer: COMMERCIAL

## 2024-11-04 VITALS
SYSTOLIC BLOOD PRESSURE: 114 MMHG | HEART RATE: 110 BPM | BODY MASS INDEX: 37 KG/M2 | WEIGHT: 207.19 LBS | DIASTOLIC BLOOD PRESSURE: 70 MMHG

## 2024-11-04 DIAGNOSIS — O09.529 ANTEPARTUM MULTIGRAVIDA OF ADVANCED MATERNAL AGE (HCC): ICD-10-CM

## 2024-11-04 DIAGNOSIS — Z34.80 SUPERVISION OF OTHER NORMAL PREGNANCY, ANTEPARTUM (HCC): Primary | ICD-10-CM

## 2024-11-04 DIAGNOSIS — O24.410 DIET CONTROLLED GESTATIONAL DIABETES MELLITUS (GDM), ANTEPARTUM (HCC): ICD-10-CM

## 2024-11-04 DIAGNOSIS — Z23 NEED FOR VACCINATION: ICD-10-CM

## 2024-11-04 PROCEDURE — 90715 TDAP VACCINE 7 YRS/> IM: CPT | Performed by: NURSE PRACTITIONER

## 2024-11-04 PROCEDURE — 90471 IMMUNIZATION ADMIN: CPT | Performed by: NURSE PRACTITIONER

## 2024-11-04 NOTE — PROGRESS NOTES
KULWINDER  Doing well, some low back pain  GOOD FM  Denies VB/LOF/uctx  Rh positive, TDAP received  Dx GDM, has not scheduled appointment with educator. I strongly encouraged they schedule this soon.  RTC in 2 wks  Fetal movement instructions given

## 2024-11-06 ENCOUNTER — TELEPHONE (OUTPATIENT)
Age: 39
End: 2024-11-06

## 2024-11-11 ENCOUNTER — LAB ENCOUNTER (OUTPATIENT)
Dept: LAB | Facility: HOSPITAL | Age: 39
End: 2024-11-11
Attending: NURSE PRACTITIONER
Payer: COMMERCIAL

## 2024-11-11 ENCOUNTER — DIABETIC EDUCATION (OUTPATIENT)
Age: 39
End: 2024-11-11
Payer: COMMERCIAL

## 2024-11-11 DIAGNOSIS — O24.415 GESTATIONAL DIABETES MELLITUS (GDM) IN THIRD TRIMESTER CONTROLLED ON ORAL HYPOGLYCEMIC DRUG (HCC): Primary | ICD-10-CM

## 2024-11-11 DIAGNOSIS — Z34.80 SUPERVISION OF OTHER NORMAL PREGNANCY, ANTEPARTUM (HCC): ICD-10-CM

## 2024-11-11 LAB — T PALLIDUM AB SER QL IA: NONREACTIVE

## 2024-11-11 PROCEDURE — 86780 TREPONEMA PALLIDUM: CPT

## 2024-11-11 PROCEDURE — 36415 COLL VENOUS BLD VENIPUNCTURE: CPT

## 2024-11-11 PROCEDURE — 87389 HIV-1 AG W/HIV-1&-2 AB AG IA: CPT

## 2024-11-11 NOTE — PATIENT INSTRUCTIONS
Blood Glucose Goals    Start checking your blood sugars 4 times/day:  1.) Fasting (before your first meal of the day)  2.) 2 hours after breakfast  3.) 2 hours after lunch  4.) 2 hours after dinner    Bring your recorded glucose log sheet and food log sheet to your follow up visit in 2 weeks for review.    Ranges:   Fasting: less than 95 mg/dL  2 hours after meal: less than 120 mg/dL    Food Goals    Healthy, well rounded diet with plenty of water.    At least 175 grams of carbohydrates a day.    If 3 full meals is too much for you, aim for small frequent snacks.     Avoid going longer than 5 hours between meals/snacks.    Meal Plan:  Spreading out our carbohydrates throughout the day and adding in small amounts of movement after meals can help us deal with the glucose better.     Breakfast: 30 grams of carbohydrates.  AM Snack: 15 grams of carbohydrates.  Lunch: 45 grams of carbohydrates.  PM Snack: 15 grams of carbohydrates.  Dinner: 45 grams of carbohydrates.  Bedtime Snack: 30 grams of carbohydrates.    Akiko Mendez, RD, , LD, CDCES(she/her/hers)  Diabetes Educator  [facility]  [email or web address]  [phone] phone  [phone] Fax

## 2024-11-11 NOTE — PROGRESS NOTES
Sandra Omer   1985 was seen for Gestational Diabetes Counseling: Individual    Date: 2024  Referring Provider: Lexie Mathews Start time: 12:00pm End time: 12:45pm      Assessment: LMP 2024 (Exact Date)   Weight:   Wt Readings from Last 6 Encounters:   24 207 lb 3.2 oz   10/15/24 206 lb 3.2 oz   24 203 lb 9.6 oz   24 200 lb   24 200 lb 12.8 oz   24 200 lb     Greenlandic  utilized # [id number] Tae    Ms. Omer presents today for initial gestational diabetes education. She has been doing well this pregnancy but experienced heartburn managed with tums.    Estimated Date of Delivery: 25   Gestation: 29w4d    History:     OB History    Para Term  AB Living   3 2 2 0 0 2   SAB IAB Ectopic Multiple Live Births   0 0 0 1 2        GDM Screen:   Lab Results   Component Value Date    GLUFG 96 (H) 10/19/2024    GLU1G 181 (H) 10/19/2024    GLU2G 125 10/19/2024    GLU3G 135 10/19/2024        GLUCOSE 1HR OB   Date Value Ref Range Status   10/12/2024 176 (H) See comment mg/dL Final     Comment:     If the plasma glucose level measured after 1 hour is >=130, 135 or 140 mg/dl proceed to \"Glucose Tolerance, 100 gm (0 hr, 1 hr, 2hr, 3hr), Gestational (ADA)\" test on a separate day, as clinically indicated.       2024 150 (H) See comment mg/dL Final     Comment:     If the plasma glucose level measured after 1 hour is >=130, 135 or 140 mg/dl proceed to \"Glucose Tolerance, 100 gm (0 hr, 1 hr, 2hr, 3hr), Gestational (ADA)\" test on a separate day, as clinically indicated.            History of GDM:  No  Family history of Type 2 Diabetes: no     Diet & Exercise:     Obtained usual diet history:      TYPICAL  FOOD  NOTES   Breakfast  Two slice of bread and glass of milk         Lunch  Rice and meat pasta and fish          Dinner  none         Snacks  Fruit, cereal          Beverages  Juice and water         Eating out  No              Current  physical activity: just house, kids       Education:     GDM Overview:  Reviewed gestational diabetes as diagnosis including target blood glucose values.  Benefits, risks, and management options for improving/maintaining glucose control to mother/baby discussed.    Healthy Eating:  Discussed nutrition concepts for pregnancy/healthy eating and effects of food on BG value.  Timing of meals; what is a carbohydrate, protein, fat.  Taught: Carb counting, label reading, meal planning.  Suggested minimal carb intake of 175 gm.    Being Active:  Benefits and effects of activity on BG discussed.  Reviewed types of recommended activity, duration, precautions, and when to call MD.    Monitoring:  Instructed on how to use glucose monitor/proper lancet disposal. Checking schedules are:   Fastin-95 mg/dL, Call MD is >105 md/dL twice in 1 week   2 Hour Post Prandial:  Less than 120 md/dL, Call MD if >140 md/dL twice in 1 week.    Pt came in with a a glucose meter:  No  Instructed /demonstrated ability to perform blood glucose checking on: self  BG 96 mg/dL two hours after eating    Taking Medication:  Reviewed when medication might be indicated.    Reducing Risk:  Effects of elevated blood glucose on mother/baby reviewed.  Discussed management (hyperglycemia, hypoglycemia, sick day, other) and when to call provider.  Post pregnancy management/prevention of Type 2 DM, and increased risk of having diabetes later in life reviewed.    Healthy Coping:  Family involvement/social support encouraged.  Identification of lifestyle behaviors willing to change discussed.    Training Tools Provided:   handout.  BG Log Sheets    Recommendations:      1. Follow recommended meal plan.   2. Begin checking fasting glucose and 2 hour after meals   3. Bring glucose / food log to next visit with diabetes educator. Bring glucose log sheets to MD office visits.   4. Encouraged activity if no restrictions.   5. Encouraged Sandra to contact  the diabetes center with any questions or concerns.    Glucose meter kit, test strips and lancets sent to preferred pharmacy.    Patient verbalized understanding and has no further questions at this time.  Emailed/written materials provided for all topics covered.    Scheduled pt to follow-up x 1 with the [facility] 11/25/2024   Future Appointments   Date Time Provider Department Center   11/18/2024 11:45 AM Jose Manuel Sheppard MD [facility] [place]   11/25/2024 12:00 PM Akiko Mendez RD [facility]   12/2/2024  9:00 AM [facility]   12/2/2024  1:30 PM Amarilis Orellana MD [facility] [address]   12/16/2024 12:30 PM Shari Zhao MD [facility] [place]   12/30/2024 11:00 AM [facility] OB [facility]SE [facility] [place]   12/30/2024 11:30 AM Adelaide Negron DO [facility] [place]   1/6/2025 11:30 AM [facility] [place]   1/6/2025 12:15 PM Luiza Marti MD [facility] [place]   1/14/2025 10:30 AM [facility] [place]   1/14/2025 11:00 AM Shari Zhao MD [facility] [place]       Akiko Mendez RD, , LD, CDCES

## 2024-11-12 RX ORDER — BLOOD SUGAR DIAGNOSTIC
STRIP MISCELLANEOUS
Qty: 100 STRIP | Refills: 3 | Status: SHIPPED | OUTPATIENT
Start: 2024-11-12

## 2024-11-12 RX ORDER — LANCETS 33 GAUGE
EACH MISCELLANEOUS
Qty: 100 EACH | Refills: 3 | Status: SHIPPED | OUTPATIENT
Start: 2024-11-12

## 2024-11-12 RX ORDER — BLOOD-GLUCOSE METER
EACH MISCELLANEOUS
Qty: 1 KIT | Refills: 0 | Status: SHIPPED | OUTPATIENT
Start: 2024-11-12

## 2024-11-15 NOTE — PATIENT INSTRUCTIONS
VACCINE RECOMMENDATIONS     Pertussis- [place] has significant outbreaks of pertussis (whooping cough) every year.  Therefore, the CDC recommends that all pregnant women receive a Tdap vaccine during pregnancy, regardless of whether you have received one previously.  The vaccine reduces your chances of yaw the illness, and also provides some natural immunity to your baby for possible exposures after birth.  The best time to get the vaccine is between 27 and 36 weeks.  Baby caregivers (grandparents, spouse) should also make sure they are up to date on the vaccine (within the last 10 years).     Influenza- Pregnant women who develop the flu are more prone to serious complications that can affect both mother and baby.  The CDC recommends that all women who will be pregnant during flu season (October to May) receive the flu vaccine (injection only, not nasal spray).  The vaccine can be given during any stage of pregnancy.     Both vaccines are offered in our office, as well as through many pharmacies and primary care offices.    Covid-19 Vaccine   If you are pregnant or were recently pregnant, you are more likely to get very sick from COVID-19 than people who are not pregnant. Additionally, if you have COVID-19 during pregnancy, you are more likely to have complications that can affect your pregnancy and developing baby.  Please check the CDC website for the most up-to-date recommendations on Covid vaccination at www.cdc.gov/coronavirus/vaccine    COVID-19 vaccination is recommended for everyone ages 6 months and older, including people who are pregnant, breastfeeding, trying to get pregnant now, or who might become pregnant in the future. Evidence shows that COVID-19 vaccination before and during pregnancy is safe and effective and suggests that the benefits of vaccination outweigh any known or potential risks. New data show that vaccination during pregnancy can help protect babies younger than 6 months old,  when they are too young to be vaccinated themselves, from hospitalization due to COVID-19.    RSV Vaccine  RSV (Respiratory Syncytial Virus) is a common respiratory virus that causes cold like symptoms in adults and babies.  Infants and adults over 60 years old are more likely to develop severe RSV infection requiring hospitalization.  A new RSV vaccine was released in October 2023 that if given during the third trimester of pregnancy, reduces your baby's risk of being hospitalized with RSV after birth by up to 80%.  Therefore the CDC recommends that pregnant moms receive one dose of the RSV vaccine Pfizer Abrysvo sometime between 32 and 36 weeks of pregnancy, before or during RSV season (September through January).  If you decide not to get the vaccine, you can talk to your pediatrician about your baby receiving the RSV antibody injection Beyfortus after birth.       Labor Instructions    How do I know if it’s true labor?  One of the most important aspects of any pregnancy is being able to recognize the onset of labor.  Unfortunately, on occasion it can be difficult or confusing, especially if you have had one or more children.  Each labor can begin in a different way even if you have had four or five children.    If this is your first child, it is very common to have labor for an average greater than 10 hours; however, there have been rare instances for labor to be two hours or less for a first time mother. It is more important for you to know if this is your second or third baby to realize that any labor after the first is usually shorter.  There is no way to tell how long or short the labor will be. Therefore, please call us if you are unsure labor has started.       Usually, during the last six weeks of pregnancy, Kevan-Kamara contractions or “false labor pains occur”.  False labor is generally not very painful though it is not always easy to tell.  You may feel contractions, cramps or uterine tightening  somewhere between every 3-30 minutes but they will not continue to get stronger over time.  If you lie down, drink plenty of fluid or walk around, the contractions may go away.   False contractions are very common if you have been active on your feet for several hours.   Women frequently worry about being sent home from the hospital without having their baby (i.e. the labor stopped).  Actually, this is an unnecessary worry, for this is an infrequent occurrence.     True labor usually begins in one of two ways.  In most patients it begins with contractions of the uterus, which are irregular (but not always) in the beginning.  They are cramp-like in character and feel similar to menstrual cramps.  After a while, they become more regular, and they seem to last a little longer, and feel a little sharper.  These symptoms are very important-more important- than the timing of the contractions.  Having regular (usually closer), longer lasting (35-70 seconds), and sharper (more painful) contractions are the common symptoms of actual labor.  The second way in which labor can begin which occurs in approximately 30% of all patients is the rupture of the bag of water.  This is a sudden gush of watery fluid, usually sufficient to run down your leg and onto the floor, or you may wet a large area of the bed if it happens at night.  There may also be tricking that is uncontrolled.  If you are unsure, please call the office.      When should I call?  When contractions are strong and every 3-5 minutes.  If you have a gush of water or you think you might be leaking fluid.  If you are bleeding heavily.  If your baby is not moving around at least every 1-2 hours.  If you are worried about something.  When you think you are ready to go to the hospital.    Who do I call?  Call the office at [phone].  If the office is closed, the answering service will send a message to the physician on call.  The on call physician will be available  for emergency phone calls only.          Can I eat in labor?  It is good to eat a light meal at home before going to the hospital.  Eat foods like crackers, popsicles, soup and fruit.  Avoid foods that are difficult to digest like meat, a lot of dairy products and high fat foods.  DO NOT EAT if you know you are scheduled for a  ().      What will happen when I get to the hospital?  When you arrive at the hospital, you will be admitted and examined.   There are a few factors that will determine if you will be allowed to be up out of bed or if you would need to stay in bed.  The main factors are how well the baby is entering into the pelvis and if the bag of he is in intact or ruptured.  An intravenous (IV) solution will, with exceptions, be started.  This is extremely important especially for the baby.   Your  will be allowed in the room during your labor. During the delivery, the nurses will inform you of the hospital policy and how many coaches are allowed.  You may desire pain medication or anesthesia for pain.  You probably discussed some aspects of pain medication with us during your prenatal care.  The various options may also have been discussed in Prenatal Classes.  We utilize IV narcotics and epidural anesthesia when our patients request to have them.  If you chose to have no anesthesia, none will be administered.  A local anesthetic may be used at the time of delivery      What should I to bring to the hospital?  Maternity clothes to go home in  You can bring your own night gown to wear after giving birth, but most women prefer to wear the hospital gown because it may get soiled  Nursing Bra if you are planning to breastfeed  Clothes for your baby to go home in  Baby Car Seat.  Be sure you know how to install it correctly. Please install it before going to the hospital  Routine toiletries like toothbrush, shampoo, hairbrush and etc.   You can bring your favorite pillow, but please  put a colored pillow case on it so it doesn’t get mixed up with hospital pillows    How long will I stay in the hospital?  The date you leave the hospital may vary depending on the speed of your recovery.  If you have a vaginal delivery, you will stay in the hospital 24-48 hours after your delivery as long as you aren’t having any complications.    If you have had a , you will stay in the hospital 48-72 hours as long as you aren’t having any complications.       Going Home Instructions  There are no set rules as to what you may do each day or week after you are home.  You will receive discharge instructions to help you each day.  Remember, early ambulation in the hospital is to prevent complications.  Do not let this lull you into a false sense of strength or ability to do certain physical acts which may tire you excessively.  Please call the office within a few days after you are discharged from the hospital to schedule your post-partum visit, which is usually 4-6 weeks after delivery.    Any medications necessary will be discussed on an individual basis.  If you decide to breastfeed your baby, you should continue your prenatal vitamins.  If you do not breastfeed, simply finish the prenatal vitamins you have.      The staff at [facility] wish you a joyous and exciting birth.  If there is anything we can do to make this a better experience for you please do not hesitate to ask.

## 2024-11-18 ENCOUNTER — ROUTINE PRENATAL (OUTPATIENT)
Dept: OBGYN CLINIC | Facility: CLINIC | Age: 39
End: 2024-11-18
Payer: COMMERCIAL

## 2024-11-18 ENCOUNTER — PATIENT MESSAGE (OUTPATIENT)
Dept: OBGYN CLINIC | Facility: CLINIC | Age: 39
End: 2024-11-18

## 2024-11-18 VITALS
HEIGHT: 63 IN | SYSTOLIC BLOOD PRESSURE: 110 MMHG | HEART RATE: 98 BPM | DIASTOLIC BLOOD PRESSURE: 72 MMHG | WEIGHT: 203.81 LBS | BODY MASS INDEX: 36.11 KG/M2

## 2024-11-18 DIAGNOSIS — O24.419 GESTATIONAL DIABETES MELLITUS (GDM), ANTEPARTUM, GESTATIONAL DIABETES METHOD OF CONTROL UNSPECIFIED (HCC): ICD-10-CM

## 2024-11-18 DIAGNOSIS — Z23 NEED FOR VACCINATION: ICD-10-CM

## 2024-11-18 DIAGNOSIS — O09.523 MULTIGRAVIDA OF ADVANCED MATERNAL AGE IN THIRD TRIMESTER (HCC): ICD-10-CM

## 2024-11-18 DIAGNOSIS — Z34.93 THIRD TRIMESTER PREGNANCY (HCC): Primary | ICD-10-CM

## 2024-11-18 PROBLEM — O99.810 ABNORMAL MATERNAL GLUCOSE TOLERANCE, ANTEPARTUM (HCC): Status: RESOLVED | Noted: 2024-07-29 | Resolved: 2024-11-18

## 2024-11-18 PROCEDURE — 90471 IMMUNIZATION ADMIN: CPT | Performed by: OBSTETRICS & GYNECOLOGY

## 2024-11-18 PROCEDURE — 90656 IIV3 VACC NO PRSV 0.5 ML IM: CPT | Performed by: OBSTETRICS & GYNECOLOGY

## 2024-11-18 NOTE — PROGRESS NOTES
[place] - patient's  served as .     KULWINDER  39 year old yo, , at GA 30w4d    Vitals:    24 1148   BP: 110/72   Pulse: 98   Weight: 203 lb 12.8 oz (92.4 kg)   Height: 63\"       Doing well, +FM   Denies LOF/VB/uctx  Rh +, TDAP received 2024.     Blood sugar log discussed - sounds like >50% of fasting and >50% of 2 hour postprandial values are elevated. Patient to send in copy of log via [employer] when they get home.    - Discussed that given the persistent elevations in values, patient will likely need insulin management.  - New MFM consult with diabetes education sent along with Epic message to Diabetic educator for management.     Assessment:     Sandra Omer is a 39 year old  at 30w4d who presents for routine OB visit. Overall doing well.     Problem List Items Addressed This Visit          Gravid and     Advanced maternal age in multigravida ([facility])     Other Visit Diagnoses       Third trimester pregnancy ([facility])    -  Primary    Gestational diabetes mellitus (GDM), antepartum, gestational diabetes method of control unspecified ([facility])        Relevant Orders    Maternal Fetal Medicine Referral - [place] Location                Plan:     Patient Active Problem List    Diagnosis    Diet controlled gestational diabetes mellitus (GDM), antepartum ([facility])     10/20/2024- elevated 2/4- referred to DM Education  Saw DM education 2024      Abnormal maternal glucose tolerance, antepartum ([facility])     [X] abnormal early 1 hr GTT  [x ] 3 hr GTT- Nl    Elevated regular 1 hour  on 10/13/2024- 3 hour GTT ordered      Urinary tract infection in mother during pregnancy, antepartum ([facility])     Klebsiella --> oral antibiotics   [x] repeat urine culture was normal      Supervision of other normal pregnancy, antepartum ([facility])    Obesity complicating pregnancy ([facility])     [x] early 1 hr GTT- elevated but normal early 3 hour GTT      History of      X2  [ ] repeat   scheduled 2025 at 07:30 am       Advanced maternal age in multigravida ([facility])     Low dose ASA  [ x] MFM consult  Follow-up growth & BPP ultrasound at 32 weeks gestation  Weekly NST's at 36 weeks.  Limit weight gain to 11-20 pounds.  [X ] NIPT wnl         - continue routine prenatal care   - labor and rupture of membrane precautions provided  - Fetal movement instructions given    Return to clinic in 2 weeks for KULWINDER visit       Jose Manuel Sheppard MD   [facility] - OBGYN      Note to patient and family   The 21st Century Cures Act makes medical notes available to patients in the interest of transparency.  However, please be advised that this is a medical document.  It is intended as ydat-[id number]-gntm communication.  It is written and medical language may contain abbreviations or verbiage that are technical and unfamiliar.  It may appear blunt or direct.  Medical documents are intended to carry relevant information, facts as evident, and the clinical opinion of the practitioner.

## 2024-11-25 ENCOUNTER — DIABETIC EDUCATION (OUTPATIENT)
Age: 39
End: 2024-11-25
Payer: COMMERCIAL

## 2024-11-25 DIAGNOSIS — O24.415 GESTATIONAL DIABETES MELLITUS (GDM) IN THIRD TRIMESTER CONTROLLED ON ORAL HYPOGLYCEMIC DRUG (HCC): Primary | ICD-10-CM

## 2024-11-25 NOTE — PROGRESS NOTES
Sandra Omer  : 1985 was seen for GDM  Individual Follow-Up Counseling    Date: 2024  Referring Provider: No ref. provider found    Start time: 12:00pm End time: 12:20pm      Assessment: LMP 2024 (Exact Date)   Weight:   Wt Readings from Last 6 Encounters:   24 203 lb 12.8 oz   24 207 lb 3.2 oz   10/15/24 206 lb 3.2 oz   24 203 lb 9.6 oz   24 200 lb   24 200 lb 12.8 oz       Ms. Omer presents today for follow up gestational diabetes education. She has overall been doing well. She had a few elevated blood sugar post meal due to trying a juice she thought was diet and carb smart wraps. Fasting in goal about half of the time- discussed better night time snacks. Same breakfast everyday and if she eats it when fasting numbers at goal post breakfast will be at goal, if fasting elevated blood sugars will be elevated too after breakfast. Discussed changing breakfast for days fasting elevated on waking.     Estimated Date of Delivery: 25   Gestation:31w4d    Blood Glucose Levels:      Lowest Highest Average Amount out of target   Fasting    91 mg/dL  107 mg/dL  97.5 mg/dL  5 readings/11 total   2hr post Breakfast    94 mg/dL  144 mg/dL  118.5 mg/dL  5 readings/11 total   2hr post Lunch    79 mg/dL  171 mg/dL  119.5 mg/dL  5 readings/10 total   2hr post Dinner    108 mg/dL  146 mg/dL  121 mg/dL  5 readings/9 total     She brought in BG log. Reviewed in detail with patient. Copies made and sent to scan. Provided copy to patient. Discussed diet juice options, balancing meals, adding protein to breakfast and night time snack, alternative wraps, limiting high fat meats.    Diet & Exercise:     Obtained diet history from last 2 weeks:     TYPICAL  FOOD  NOTES   Breakfast  Slice of bread and glass of milk         Lunch  Rice and beans, chicken and potatoes, rice and fish and potatoes, rice and meat and apple juice, chipotle bowl, pizza         Dinner  Egg,  cereal, chicken wrap, watermelon         Snacks  Granola bar, grape           Following meal plan: yes  Skips: none  Meals are balanced moderately.  Carb Intake is adequate.  Food Selections are appropriate.  Drinking plenty of water yes.    walking daily in the morning    Education:     GDM Overview:  Reviewed target blood glucose values for GDM.  Discussed benefits/risks to mother/baby management options to improve/maintain glucose control.     Healthy Eating:  Reviewed/Reinforced:  Nutrition concepts for pregnancy/healthy eating and effect of food on blood glucose.  Meal planning process and benefits of pre-planning meals/snacks.  Appropriate timing of meals/snacks.  Carb counting.  4 servings of calcium while pregnant/breastfeeding.  Additional concepts: Increasing fiber and food safety, micronutrients    Being Active:  Reviewed benefits of effects of activity on BG values.  Reviewed types of activity, duration, precautions.    Monitoring:  Instructed to report readings to MD as directed.  Call MD: if fasting blood glucose is > 95 twice in 1 week. If 2 hr postprandial is > 120 twice in 1 week at any one meal.    Taking Medication:  Reviewed appropriate timing (if on insulin) of meds.   Reviewed probability of needing medication adjustments throughout pregnancy.     Reducing Risk:  Discussed management of (hyperglycemia, hypoglycemia) and when to call provider.    Recommendations:      1. Follow recommended meal plan.   2. Test blood glucose and ketones as directed.    3. Bring glucose log to each MD visit.   4. Start/continue activity if no restrictions.    5. Additional recommendations: food safety, micronutrients    Patient verbalized understanding and has no further questions at this time.    Akiko Mendez, RD, , LD, [facility]

## 2024-11-25 NOTE — PATIENT INSTRUCTIONS
Blood Glucose Goals     Continue checking your blood sugars 4 times/day:  1.) Fasting (before your first meal of the day)  2.) 2 hours after breakfast  3.) 2 hours after lunch  4.) 2 hours after dinner     Ranges:   Fasting: less than 95 mg/dL  2 hours after meal: less than 120 mg/dL     Let your OB know if:  - your fasting is more than 95 mg/dL 2x in a week  - your 2 hours after a meal is more than 120 mg/dL 2x in a week     Food Goals     Healthy, well rounded diet with plenty of water.     At least 175 grams of carbohydrates a day.     If 3 full meals is too much for you, aim for small frequent snacks.      Avoid going longer than 5 hours between meals/snacks.      Aim for 4x servings of calcium per day. This will help with your baby's development. Foods with calcium include:  - Dairy products (milk, yogurt, cheese)  - Oranges and calcium added orange juice  - Kale, esperanza greens, broccoli  - Dried figs, papayas, and bananas  - Seeds: Issa, sesame  - Beans and lentils  - Almonds  - Rhubarb  - Fortified foods and drinks (cereals, non-dairy milks)  - Tofu  - [place]    Akiko Mendez, RD, , LD, CDCES(she/her/hers)  Diabetes Educator  [facility] [place]  [email or web address]  [phone] phone  [phone] Fax

## 2024-12-02 ENCOUNTER — ROUTINE PRENATAL (OUTPATIENT)
Dept: OBGYN CLINIC | Facility: CLINIC | Age: 39
End: 2024-12-02
Payer: COMMERCIAL

## 2024-12-02 ENCOUNTER — OFFICE VISIT (OUTPATIENT)
Dept: PERINATAL CARE | Facility: HOSPITAL | Age: 39
End: 2024-12-02
Attending: OBSTETRICS & GYNECOLOGY
Payer: COMMERCIAL

## 2024-12-02 VITALS
BODY MASS INDEX: 36.5 KG/M2 | HEIGHT: 63 IN | DIASTOLIC BLOOD PRESSURE: 74 MMHG | HEART RATE: 104 BPM | SYSTOLIC BLOOD PRESSURE: 112 MMHG | WEIGHT: 206 LBS

## 2024-12-02 VITALS
HEIGHT: 63 IN | DIASTOLIC BLOOD PRESSURE: 68 MMHG | SYSTOLIC BLOOD PRESSURE: 110 MMHG | HEART RATE: 109 BPM | BODY MASS INDEX: 36.14 KG/M2 | WEIGHT: 204 LBS

## 2024-12-02 DIAGNOSIS — Z34.80 SUPERVISION OF OTHER NORMAL PREGNANCY, ANTEPARTUM (HCC): Primary | ICD-10-CM

## 2024-12-02 DIAGNOSIS — O24.410 DIET CONTROLLED GESTATIONAL DIABETES MELLITUS (GDM) IN THIRD TRIMESTER (HCC): ICD-10-CM

## 2024-12-02 DIAGNOSIS — O09.529 ADVANCED MATERNAL AGE IN MULTIGRAVIDA (HCC): Primary | ICD-10-CM

## 2024-12-02 DIAGNOSIS — O09.529 ADVANCED MATERNAL AGE IN MULTIGRAVIDA (HCC): ICD-10-CM

## 2024-12-02 DIAGNOSIS — O09.523 MULTIGRAVIDA OF ADVANCED MATERNAL AGE IN THIRD TRIMESTER (HCC): ICD-10-CM

## 2024-12-02 DIAGNOSIS — O24.410 DIET CONTROLLED GESTATIONAL DIABETES MELLITUS (GDM), ANTEPARTUM (HCC): ICD-10-CM

## 2024-12-02 PROCEDURE — 76816 OB US FOLLOW-UP PER FETUS: CPT | Performed by: OBSTETRICS & GYNECOLOGY

## 2024-12-02 PROCEDURE — 76819 FETAL BIOPHYS PROFIL W/O NST: CPT

## 2024-12-02 NOTE — PROGRESS NOTES
Outpatient Maternal-Fetal Medicine Consultation    Dear Dr. Zhao    Thank you for requesting ultrasound evaluation and maternal fetal medicine consultation on your patient Sandra Omer.  As you are aware she is a 39 year old female  with a singletonpregnancy and an Estimated Date of Delivery: 25.  A maternal-fetal medicine consultation was requested secondary to advanced maternal age and gestational diabetes.  Her prenatal records and labs were reviewed.    HISTORY  # 1A - Date: None, Sex: Male, Weight: None, GA: None, Type: Caesarean Section, Apgar1: None, Apgar5: None, Living: Living, Birth Comments: None  # 1B - Date: None, Sex: Female, Weight: None, GA: 38w0d, Type: Caesarean Section, Apgar1: None, Apgar5: None, Living: Living, Birth Comments: None    # 2 - Date: None, Sex: Male, Weight: None, GA: 41w0d, Type: Caesarean Section, Apgar1: None, Apgar5: None, Living: None, Birth Comments: None    # 3 - Date: None, Sex: None, Weight: None, GA: None, Type: None, Apgar1: None, Apgar5: None, Living: None, Birth Comments: None    Past Medical History  The patient  has no past medical history on file.    Past Surgical History  The patient  has a past surgical history that includes Hand Surgery (Left) and .    Family History  The patient has no family status information on file.       Medications:   Current Outpatient Medications:     Cholecalciferol (VITAMIN D3) 1.25 MG (90012 UT) Oral Cap, TAKE 1 CAPSULE BY MOUTH ONCE PER WEEK FOR 12 WEEKS., Disp: , Rfl:     Prenatal Vit-Fe Fumarate-FA (PRENATAL VITAMINS OR), Take by mouth., Disp: , Rfl:     Blood Glucose Monitoring Suppl (ONETOUCH ULTRA 2) w/Device Does not apply Kit, Use as directed 4x per day, Disp: 1 kit, Rfl: 0    OneTouch Delica Lancets 33G Does not apply Misc, Check 4 times per day for gestational diabetes, Disp: 100 each, Rfl: 3    Glucose Blood (ONETOUCH VERIO) In Vitro Strip, Check 4 times per day for gestational diabetes,  Disp: 100 strip, Rfl: 3    cefpodoxime 100 MG Oral Tab, Take 1 tablet (100 mg total) by mouth 2 (two) times daily. (Patient not taking: Reported on 11/18/2024), Disp: 10 tablet, Rfl: 0  Allergies: No Known Allergies    PHYSICAL EXAMINATION:  /68 (BP Location: Right arm, Patient Position: Sitting, Cuff Size: adult)   Pulse 109   Ht 5' 3\" (1.6 m)   Wt 204 lb (92.5 kg)   LMP 04/18/2024 (Exact Date)   BMI 36.14 kg/m²   General: alert and oriented,no acute distress  Abdomen: gravid, soft, non-tender  Extremities: non-tender, no edema      DISCUSSION  During her visit we discussed and reviewed the following issues:        3hour GTT:   fasting  (<95) --   96                       1 hour (<180) -- 181                       2 hour (<155) -- 125                       3 hour (<140) -- 135    She stated her glucose numbers are normal.  Sending logs to OB.       She was informed of the potential implications and risks associated with gestational diabetes (GDM) to her and her unborn child, especially when poorly controlled. We discussed about the increased incidence of macrosomia and related birth injury to her and her baby. We talked about the increased and associated risk of fetal hyperinsulinemia, jaundice, electrolyte imbalance, seizure activity, IUFD and adverse outcome. We talked about her increased risk of having diabetes later in life. The importance of good glycemic control and avoidance of prolonged hypo- and hyperglycemia was addressed.           If she requires medication, I would suggest growth ultrasound monthly in the third trimester; NST weekly by about 32 weeks and increase to twice weekly by 34 weeks.         She will be testing her blood sugars at fasting and 2 hour postprandially.   Fasting blood glucose should be less than 95 and two hour postprandial <120.         OBESITY  This patient has obesity; her pre-gravid BMI is: 35.8  Obesity during pregnancy is associated with numerous maternal and   risks.  It is not clear whether obesity is a direct cause of adverse pregnancy outcome or whether the association between obesity and adverse pregnancy outcome is due to factors such as diabetes mellitus.   Data suggest that obese women should be encouraged to undertake a weight reduction program (diet, exercise, behavior modification, and possibly bariatric surgery in some cases) prior to attempting to conceive.              ADVANCED MATERNAL AGE    Background  I reviewed with the patient that pregnancies in women of advanced maternal age (35 or older at delivery) are associated with elevated risks.  Specifically, there is a higher rate of:    Fetal malformations  Preeclampsia  Gestational diabetes  Intrauterine fetal death      The patient has already obtained a low-risk  NPIT result and was appropriately reassured.         OB ULTRASOUND REPORT   See imaging tab for complete ultrasound report or in PACS    Single IUP in cephalic presentation.    Placenta is posterior.   A 3 vessel cord is noted.  Cardiac activity is present at 148 bpm  EFW 1884 g ( 4 lb 2 oz); 37%.    MVP is 3.4 cm . SOCRATES 10.4 cm        BIOPHYSICAL PROFILE:  Movement:    2/2  Tone:            2/2  Breathin/2  Fluid:             2/2  TOTAL:             IMPRESSION:  IUP at 32w4d   AMA: low-risk cell free fetal DNA, declined invasive testing  Obesity  Gestational diabetes    RECOMMENDATIONS:  Continue care with Dr. Zhao  Weekly NST's at 36 weeks.  Limit weight gain to 11-20 pounds.    Thank you for allowing me to participate in the care of your patient.  Please do not hesitate to contact me if additional questions or concerns arise.      Faizan Fraga D.O.  Maternal Fetal Medicine     25 minutes spent in review of records, patient consultation, documentation and coordination of care.  The relevant clinical matter(s) are summarized above.     Note to patient and family  The 21st Century Cures Act makes medical notes available to  patients in the interest of transparency.  However, please be advised that this is a medical document.  It is intended as [id number] communication.  It is written and medical language may contain abbreviations or verbiage that are technical and unfamiliar.  It may appear blunt or direct.  Medical documents are intended to carry relevant information, facts as evident, and the clinical opinion of the practitioner.

## 2024-12-02 NOTE — PROGRESS NOTES
Pt here for Growth Ultrasound  +fm noted per patient  Pt denies complaints.   Pt GDM--diet controlled--OB managing

## 2024-12-02 NOTE — PROGRESS NOTES
[facility]  Obstetrics and Gynecology  Routine OB Visit Progress Note    Subjective:     Sandra Omer is a 39 year old  at 32w4d who presents for routine OB visit.  Patient reports doing well.  Denies contractions, vaginal bleeding or leakage of fluid.  Good fetal movement.    Rh +, TDAP and Flu received  EPDS Depression Scale Total: 0 (2024  8:23 AM)   HIV and RPR done    Saw MFM today; blood sugars are better and does not need insulin.  Reviewed logs; fasting elevated; postprandials in the last week are better but fastings still elevated  Discussed at least starting Metformin 500 mg nightly and will need increased monitoring    Objective:   /74   Pulse 104   Ht 63\"   Wt 206 lb (93.4 kg)   LMP 2024 (Exact Date)   BMI 36.49 kg/m²   ABD: gravid, nontender  FHT: 159       Assessment:     Sandra Omer is a 39 year old  at 32w4d who presents for routine OB visit. Overall doing well.     Problem List Items Addressed This Visit          Endocrine and Metabolic    Diet controlled gestational diabetes mellitus (GDM), antepartum (HCC)    Relevant Medications    metFORMIN 500 MG Oral Tab       Gravid and     Supervision of other normal pregnancy, antepartum (HCC) - Primary    Relevant Medications    metFORMIN 500 MG Oral Tab       Plan:     Patient Active Problem List    Diagnosis    Diet controlled gestational diabetes mellitus (GDM), antepartum (HCC)     10/20/2024- elevated 2/4- referred to DM Education  Saw DM education 2024 - will need to initiate insulin therapy - new Mfm referral sent along with message to RD.   24 Saw MFM today; did not start insulin; recommended continued diet control; per pt blood sugars have been normal EFW 1884 g ( 4 lb 2 oz); 37% at 32 weeks  - reviewed logs and fasting still elev but better pp control; will start Metformin 500 mg daily; will need weekly NST; and twice weekly NSTs at 34 weeks.          Urinary tract infection in mother during pregnancy, antepartum ([facility])     Klebsiella --> oral antibiotics   [x] repeat urine culture was normal      Supervision of other normal pregnancy, antepartum ([facility])    Obesity complicating pregnancy ([facility])     [x] early 1 hr GTT- elevated but normal early 3 hour GTT      History of      X2  [ ] repeat  scheduled 2025 at 07:30 am   Patient desires permanent sterilization with bilateral salpingectomy at time of C/S       Advanced maternal age in multigravida ([facility])     Low dose ASA  [ x] MFM consult  [X] Follow-up growth & BPP ultrasound at 32 weeks gestation  Weekly NST's at 36 weeks.  Limit weight gain to 11-20 pounds.  [X ] NIPT wnl         - continue routine prenatal care   - labor and rupture of membrane precautions provided  - Fetal movement instructions given    Return to clinic in 2 weeks for KULWINDER visit     Amarilis Orellana MD   [facility] - OBGYN    Note to patient and family   The  [employer] Cures Act makes medical notes available to patients in the interest of transparency.  However, please be advised that this is a medical document.  It is intended as [id number]-pbzo communication.  It is written and medical language may contain abbreviations or verbiage that are technical and unfamiliar.  It may appear blunt or direct.  Medical documents are intended to carry relevant information, facts as evident, and the clinical opinion of the practitioner.

## 2024-12-04 DIAGNOSIS — O24.415 GESTATIONAL DIABETES MELLITUS (GDM) IN THIRD TRIMESTER CONTROLLED ON ORAL HYPOGLYCEMIC DRUG (HCC): ICD-10-CM

## 2024-12-09 ENCOUNTER — ROUTINE PRENATAL (OUTPATIENT)
Dept: OBGYN CLINIC | Facility: CLINIC | Age: 39
End: 2024-12-09
Payer: COMMERCIAL

## 2024-12-09 ENCOUNTER — APPOINTMENT (OUTPATIENT)
Dept: OBGYN CLINIC | Facility: CLINIC | Age: 39
End: 2024-12-09
Payer: COMMERCIAL

## 2024-12-09 VITALS
WEIGHT: 203.13 LBS | DIASTOLIC BLOOD PRESSURE: 60 MMHG | BODY MASS INDEX: 35.99 KG/M2 | SYSTOLIC BLOOD PRESSURE: 110 MMHG | HEART RATE: 91 BPM | HEIGHT: 63 IN

## 2024-12-09 DIAGNOSIS — O24.410 DIET CONTROLLED GESTATIONAL DIABETES MELLITUS (GDM) IN THIRD TRIMESTER (HCC): ICD-10-CM

## 2024-12-09 DIAGNOSIS — Z34.80 SUPERVISION OF OTHER NORMAL PREGNANCY, ANTEPARTUM (HCC): Primary | ICD-10-CM

## 2024-12-09 DIAGNOSIS — N76.0 VAGINITIS AND VULVOVAGINITIS: ICD-10-CM

## 2024-12-09 DIAGNOSIS — O09.523 MULTIGRAVIDA OF ADVANCED MATERNAL AGE IN THIRD TRIMESTER (HCC): ICD-10-CM

## 2024-12-09 DIAGNOSIS — O24.410 DIET CONTROLLED GESTATIONAL DIABETES MELLITUS (GDM), ANTEPARTUM (HCC): ICD-10-CM

## 2024-12-09 DIAGNOSIS — Z98.891 HISTORY OF C-SECTION: ICD-10-CM

## 2024-12-09 DIAGNOSIS — O23.40 URINARY TRACT INFECTION IN MOTHER DURING PREGNANCY, ANTEPARTUM (HCC): ICD-10-CM

## 2024-12-09 DIAGNOSIS — O99.213 OBESITY AFFECTING PREGNANCY IN THIRD TRIMESTER, UNSPECIFIED OBESITY TYPE (HCC): ICD-10-CM

## 2024-12-09 LAB
APPEARANCE: CLEAR
BILIRUBIN: NEGATIVE
GLUCOSE (URINE DIPSTICK): NEGATIVE MG/DL
KETONES (URINE DIPSTICK): 15 MG/DL
LEUKOCYTES: NEGATIVE
MULTISTIX LOT#: ABNORMAL NUMERIC
NITRITE, URINE: NEGATIVE
OCCULT BLOOD: NEGATIVE
PH, URINE: 5.5 (ref 4.5–8)
PROTEIN (URINE DIPSTICK): NEGATIVE MG/DL
SPECIFIC GRAVITY: 1.01 (ref 1–1.03)
URINE-COLOR: YELLOW
UROBILINOGEN,SEMI-QN: 0.2 MG/DL (ref 0–1.9)

## 2024-12-09 PROCEDURE — 81514 NFCT DS BV&VAGINITIS DNA ALG: CPT | Performed by: STUDENT IN AN ORGANIZED HEALTH CARE EDUCATION/TRAINING PROGRAM

## 2024-12-09 PROCEDURE — 81002 URINALYSIS NONAUTO W/O SCOPE: CPT | Performed by: STUDENT IN AN ORGANIZED HEALTH CARE EDUCATION/TRAINING PROGRAM

## 2024-12-09 PROCEDURE — 59025 FETAL NON-STRESS TEST: CPT | Performed by: STUDENT IN AN ORGANIZED HEALTH CARE EDUCATION/TRAINING PROGRAM

## 2024-12-09 RX ORDER — BLOOD-GLUCOSE METER
EACH MISCELLANEOUS
Refills: 0 | OUTPATIENT
Start: 2024-12-09

## 2024-12-09 NOTE — PROGRESS NOTES
KULWINDER    GA: 33w4d  Vitals:    24 1128   BP: 110/60   Pulse: 91   Weight: 203 lb 2 oz (92.1 kg)   Height: 63\"       Doing well, +FM  Denies LOF/VB/uctx  Rh positive, TDAP and flu received, Depression Scale Total: 0 (2024  8:23 AM)     Reviewed logs for A2GDM with addition of Metformin 500mg nightly added last week. Fasting >50% elevated, postprandials all within parameters, will increase metformin to 500mg BID.    Pt also reports vaginal itching, denies abnormal discharge or spotting, no urinary complaints.    SVE C/L/H, firm, and posterior    Assessment:     Sandra Omer is a 39 year old  at 33w4d who presents for routine OB visit. Overall doing well.     Problem List Items Addressed This Visit       History of     Advanced maternal age in multigravida ([facility])    Relevant Orders    FETAL NON-STRESS TEST [facility] ONLY 72526 (Completed)    Supervision of other normal pregnancy, antepartum ([facility]) - Primary    Obesity complicating pregnancy ([facility])    Urinary tract infection in mother during pregnancy, antepartum ([facility])    Diet controlled gestational diabetes mellitus (GDM), antepartum ([facility])     Other Visit Diagnoses       Diet controlled gestational diabetes mellitus (GDM) in third trimester ([facility])        Relevant Orders    FETAL NON-STRESS TEST [facility] ONLY 56782 (Completed)    Vaginitis and vulvovaginitis        Relevant Orders    Vaginitis Vaginosis PCR Panel    URINALYSIS NONAUTO W/O SCOPE (Completed)                Plan:     Patient Active Problem List    Diagnosis    Diet controlled gestational diabetes mellitus (GDM), antepartum ([facility])     10/20/2024- elevated 2/4- referred to DM Education  Saw DM education 2024 - will need to initiate insulin therapy - new Mfm referral sent along with message to RD.   24 Saw MFM today; did not start insulin; recommended continued diet control; per pt blood sugars have been normal EFW 1884 g ( 4 lb 2 oz); 37% at 32 weeks  -  reviewed logs and fasting still elev but better pp control; will start Metformin 500 mg daily; will need weekly NST; and twice weekly NSTs at 34 weeks.   2024: fasting still elevated, postprandials within target parameters -> Will increase Metformin to 500mg BID; continue weekly NST and twice weekly NST at 34 weeks      Urinary tract infection in mother during pregnancy, antepartum ([facility])     Klebsiella --> oral antibiotics   [x] repeat urine culture was normal      Supervision of other normal pregnancy, antepartum ([facility])    Obesity complicating pregnancy ([facility])     [x] early 1 hr GTT- elevated but normal early 3 hour GTT      History of      X2  [ ] repeat  scheduled 2025 at 07:30 am   Patient desires permanent sterilization with bilateral salpingectomy at time of C/S       Advanced maternal age in multigravida ([facility])     Low dose ASA  [ x] MFM consult  [X] Follow-up growth & BPP ultrasound at 32 weeks gestation  [X ] NIPT wnl       - Vaginitis panel collected and ordered  - Udip with 15 ketones otherwise unremarkable  - continue routine prenatal care   - labor and rupture of membrane precautions provided  - Fetal movement instructions given    Return to clinic in 2 weeks for KULWINDER visit with NST; NST today reactive and reassuring    Adelaide Negron, DO  [facility] - OBGYN    Note to patient and family   The 21st Century Cures Act makes medical notes available to patients in the interest of transparency.  However, please be advised that this is a medical document.  It is intended as dhmq-ah-yteb communication.  It is written and medical language may contain abbreviations or verbiage that are technical and unfamiliar.  It may appear blunt or direct.  Medical documents are intended to carry relevant information, facts as evident, and the clinical opinion of the practitioner.

## 2024-12-09 NOTE — TELEPHONE ENCOUNTER
Spoke to Dr. Negron.  Patient was seen for appointment today and only needs refills for lancets- she is currently checking her glucose daily and already has a monitor.  Refill denied.

## 2024-12-09 NOTE — PATIENT INSTRUCTIONS
Labor Instructions    How do I know if it’s true labor?  One of the most important aspects of any pregnancy is being able to recognize the onset of labor.  Unfortunately, on occasion it can be difficult or confusing, especially if you have had one or more children.  Each labor can begin in a different way even if you have had four or five children.    If this is your first child, it is very common to have labor for an average greater than 10 hours; however, there have been rare instances for labor to be two hours or less for a first time mother. It is more important for you to know if this is your second or third baby to realize that any labor after the first is usually shorter.  There is no way to tell how long or short the labor will be. Therefore, please call us if you are unsure labor has started.       Usually, during the last six weeks of pregnancy, Kevan-Kamara contractions or “false labor pains occur”.  False labor is generally not very painful though it is not always easy to tell.  You may feel contractions, cramps or uterine tightening somewhere between every 3-30 minutes but they will not continue to get stronger over time.  If you lie down, drink plenty of fluid or walk around, the contractions may go away.   False contractions are very common if you have been active on your feet for several hours.   Women frequently worry about being sent home from the hospital without having their baby (i.e. the labor stopped).  Actually, this is an unnecessary worry, for this is an infrequent occurrence.     True labor usually begins in one of two ways.  In most patients it begins with contractions of the uterus, which are irregular (but not always) in the beginning.  They are cramp-like in character and feel similar to menstrual cramps.  After a while, they become more regular, and they seem to last a little longer, and feel a little sharper.  These symptoms are very important-more important- than the timing of the  contractions.  Having regular (usually closer), longer lasting (35-70 seconds), and sharper (more painful) contractions are the common symptoms of actual labor.  The second way in which labor can begin which occurs in approximately 30% of all patients is the rupture of the bag of water.  This is a sudden gush of watery fluid, usually sufficient to run down your leg and onto the floor, or you may wet a large area of the bed if it happens at night.  There may also be tricking that is uncontrolled.  If you are unsure, please call the office.      When should I call?  When contractions are strong and every 3-5 minutes.  If you have a gush of water or you think you might be leaking fluid.  If you are bleeding heavily.  If your baby is not moving around at least every 1-2 hours.  If you are worried about something.  When you think you are ready to go to the hospital.    Who do I call?  Call the office at [phone].  If the office is closed, the answering service will send a message to the physician on call.  The on call physician will be available for emergency phone calls only.          Can I eat in labor?  It is good to eat a light meal at home before going to the hospital.  Eat foods like crackers, popsicles, soup and fruit.  Avoid foods that are difficult to digest like meat, a lot of dairy products and high fat foods.  DO NOT EAT if you know you are scheduled for a  ().      What will happen when I get to the hospital?  When you arrive at the hospital, you will be admitted and examined.   There are a few factors that will determine if you will be allowed to be up out of bed or if you would need to stay in bed.  The main factors are how well the baby is entering into the pelvis and if the bag of he is in intact or ruptured.  An intravenous (IV) solution will, with exceptions, be started.  This is extremely important especially for the baby.   Your  will be allowed in the room during your  labor. During the delivery, the nurses will inform you of the hospital policy and how many coaches are allowed.  You may desire pain medication or anesthesia for pain.  You probably discussed some aspects of pain medication with us during your prenatal care.  The various options may also have been discussed in Prenatal Classes.  We utilize IV narcotics and epidural anesthesia when our patients request to have them.  If you chose to have no anesthesia, none will be administered.  A local anesthetic may be used at the time of delivery      What should I to bring to the hospital?  Maternity clothes to go home in  You can bring your own night gown to wear after giving birth, but most women prefer to wear the hospital gown because it may get soiled  Nursing Bra if you are planning to breastfeed  Clothes for your baby to go home in  Baby Car Seat.  Be sure you know how to install it correctly. Please install it before going to the hospital  Routine toiletries like toothbrush, shampoo, hairbrush and etc.   You can bring your favorite pillow, but please put a colored pillow case on it so it doesn’t get mixed up with hospital pillows    How long will I stay in the hospital?  The date you leave the hospital may vary depending on the speed of your recovery.  If you have a vaginal delivery, you will stay in the hospital 24-48 hours after your delivery as long as you aren’t having any complications.    If you have had a , you will stay in the hospital 48-72 hours as long as you aren’t having any complications.       Going Home Instructions  There are no set rules as to what you may do each day or week after you are home.  You will receive discharge instructions to help you each day.  Remember, early ambulation in the hospital is to prevent complications.  Do not let this lull you into a false sense of strength or ability to do certain physical acts which may tire you excessively.  Please call the office within a few days  after you are discharged from the hospital to schedule your post-partum visit, which is usually 4-6 weeks after delivery.    Any medications necessary will be discussed on an individual basis.  If you decide to breastfeed your baby, you should continue your prenatal vitamins.  If you do not breastfeed, simply finish the prenatal vitamins you have.      The staff at [facility] wish you a joyous and exciting birth.  If there is anything we can do to make this a better experience for you please do not hesitate to ask.

## 2024-12-10 LAB
BV BACTERIA DNA VAG QL NAA+PROBE: NEGATIVE
C GLABRATA DNA VAG QL NAA+PROBE: NEGATIVE
C KRUSEI DNA VAG QL NAA+PROBE: NEGATIVE
CANDIDA DNA VAG QL NAA+PROBE: NEGATIVE
T VAGINALIS DNA VAG QL NAA+PROBE: NEGATIVE

## 2024-12-16 ENCOUNTER — APPOINTMENT (OUTPATIENT)
Dept: OBGYN CLINIC | Facility: CLINIC | Age: 39
End: 2024-12-16
Payer: COMMERCIAL

## 2024-12-16 ENCOUNTER — ROUTINE PRENATAL (OUTPATIENT)
Dept: OBGYN CLINIC | Facility: CLINIC | Age: 39
End: 2024-12-16
Payer: COMMERCIAL

## 2024-12-16 VITALS
HEIGHT: 63 IN | DIASTOLIC BLOOD PRESSURE: 66 MMHG | WEIGHT: 202.38 LBS | SYSTOLIC BLOOD PRESSURE: 108 MMHG | BODY MASS INDEX: 35.86 KG/M2 | HEART RATE: 86 BPM

## 2024-12-16 DIAGNOSIS — O24.415 GESTATIONAL DIABETES MELLITUS (GDM) IN THIRD TRIMESTER CONTROLLED ON ORAL HYPOGLYCEMIC DRUG (HCC): ICD-10-CM

## 2024-12-16 DIAGNOSIS — O24.415 GESTATIONAL DIABETES MELLITUS (GDM) CONTROLLED ON ORAL HYPOGLYCEMIC DRUG, ANTEPARTUM (HCC): ICD-10-CM

## 2024-12-16 DIAGNOSIS — O24.415 GESTATIONAL DIABETES MELLITUS (GDM) IN THIRD TRIMESTER CONTROLLED ON ORAL HYPOGLYCEMIC DRUG (HCC): Primary | ICD-10-CM

## 2024-12-16 DIAGNOSIS — O09.529 ANTEPARTUM MULTIGRAVIDA OF ADVANCED MATERNAL AGE (HCC): ICD-10-CM

## 2024-12-16 DIAGNOSIS — O99.213 OBESITY AFFECTING PREGNANCY IN THIRD TRIMESTER, UNSPECIFIED OBESITY TYPE (HCC): ICD-10-CM

## 2024-12-16 DIAGNOSIS — Z34.80 SUPERVISION OF OTHER NORMAL PREGNANCY, ANTEPARTUM (HCC): Primary | ICD-10-CM

## 2024-12-16 DIAGNOSIS — O23.40 URINARY TRACT INFECTION IN MOTHER DURING PREGNANCY, ANTEPARTUM (HCC): ICD-10-CM

## 2024-12-16 PROCEDURE — 59025 FETAL NON-STRESS TEST: CPT | Performed by: OBSTETRICS & GYNECOLOGY

## 2024-12-16 RX ORDER — LANCETS 33 GAUGE
EACH MISCELLANEOUS
Qty: 100 EACH | Refills: 3 | Status: SHIPPED | OUTPATIENT
Start: 2024-12-16

## 2024-12-16 NOTE — PATIENT INSTRUCTIONS
Labor Instructions    How do I know if it’s true labor?  One of the most important aspects of any pregnancy is being able to recognize the onset of labor.  Unfortunately, on occasion it can be difficult or confusing, especially if you have had one or more children.  Each labor can begin in a different way even if you have had four or five children.    If this is your first child, it is very common to have labor for an average greater than 10 hours; however, there have been rare instances for labor to be two hours or less for a first time mother. It is more important for you to know if this is your second or third baby to realize that any labor after the first is usually shorter.  There is no way to tell how long or short the labor will be. Therefore, please call us if you are unsure labor has started.       Usually, during the last six weeks of pregnancy, Kevan-Kamara contractions or “false labor pains occur”.  False labor is generally not very painful though it is not always easy to tell.  You may feel contractions, cramps or uterine tightening somewhere between every 3-30 minutes but they will not continue to get stronger over time.  If you lie down, drink plenty of fluid or walk around, the contractions may go away.   False contractions are very common if you have been active on your feet for several hours.   Women frequently worry about being sent home from the hospital without having their baby (i.e. the labor stopped).  Actually, this is an unnecessary worry, for this is an infrequent occurrence.     True labor usually begins in one of two ways.  In most patients it begins with contractions of the uterus, which are irregular (but not always) in the beginning.  They are cramp-like in character and feel similar to menstrual cramps.  After a while, they become more regular, and they seem to last a little longer, and feel a little sharper.  These symptoms are very important-more important- than the timing of the  contractions.  Having regular (usually closer), longer lasting (35-70 seconds), and sharper (more painful) contractions are the common symptoms of actual labor.  The second way in which labor can begin which occurs in approximately 30% of all patients is the rupture of the bag of water.  This is a sudden gush of watery fluid, usually sufficient to run down your leg and onto the floor, or you may wet a large area of the bed if it happens at night.  There may also be tricking that is uncontrolled.  If you are unsure, please call the office.      When should I call?  When contractions are strong and every 3-5 minutes.  If you have a gush of water or you think you might be leaking fluid.  If you are bleeding heavily.  If your baby is not moving around at least every 1-2 hours.  If you are worried about something.  When you think you are ready to go to the hospital.    Who do I call?  Call the office at [phone].  If the office is closed, the answering service will send a message to the physician on call.  The on call physician will be available for emergency phone calls only.          Can I eat in labor?  It is good to eat a light meal at home before going to the hospital.  Eat foods like crackers, popsicles, soup and fruit.  Avoid foods that are difficult to digest like meat, a lot of dairy products and high fat foods.  DO NOT EAT if you know you are scheduled for a  ().      What will happen when I get to the hospital?  When you arrive at the hospital, you will be admitted and examined.   There are a few factors that will determine if you will be allowed to be up out of bed or if you would need to stay in bed.  The main factors are how well the baby is entering into the pelvis and if the bag of he is in intact or ruptured.  An intravenous (IV) solution will, with exceptions, be started.  This is extremely important especially for the baby.   Your  will be allowed in the room during your  labor. During the delivery, the nurses will inform you of the hospital policy and how many coaches are allowed.  You may desire pain medication or anesthesia for pain.  You probably discussed some aspects of pain medication with us during your prenatal care.  The various options may also have been discussed in Prenatal Classes.  We utilize IV narcotics and epidural anesthesia when our patients request to have them.  If you chose to have no anesthesia, none will be administered.  A local anesthetic may be used at the time of delivery      What should I to bring to the hospital?  Maternity clothes to go home in  You can bring your own night gown to wear after giving birth, but most women prefer to wear the hospital gown because it may get soiled  Nursing Bra if you are planning to breastfeed  Clothes for your baby to go home in  Baby Car Seat.  Be sure you know how to install it correctly. Please install it before going to the hospital  Routine toiletries like toothbrush, shampoo, hairbrush and etc.   You can bring your favorite pillow, but please put a colored pillow case on it so it doesn’t get mixed up with hospital pillows    How long will I stay in the hospital?  The date you leave the hospital may vary depending on the speed of your recovery.  If you have a vaginal delivery, you will stay in the hospital 24-48 hours after your delivery as long as you aren’t having any complications.    If you have had a , you will stay in the hospital 48-72 hours as long as you aren’t having any complications.       Going Home Instructions  There are no set rules as to what you may do each day or week after you are home.  You will receive discharge instructions to help you each day.  Remember, early ambulation in the hospital is to prevent complications.  Do not let this lull you into a false sense of strength or ability to do certain physical acts which may tire you excessively.  Please call the office within a few days  after you are discharged from the hospital to schedule your post-partum visit, which is usually 4-6 weeks after delivery.    Any medications necessary will be discussed on an individual basis.  If you decide to breastfeed your baby, you should continue your prenatal vitamins.  If you do not breastfeed, simply finish the prenatal vitamins you have.      The staff at [facility] wish you a joyous and exciting birth.  If there is anything we can do to make this a better experience for you please do not hesitate to ask.

## 2024-12-16 NOTE — PROGRESS NOTES
KULWINDER    GA: 34w4d  Vitals:    24 1135   BP: 108/66   Pulse: 86   Weight: 202 lb 6 oz (91.8 kg)   Height: 63\"       Doing well, +FM  Denies LOF/VB/uctx  Rh +, TDAP received, EPDS 0      Assessment:     Sandra Omer is a 39 year old  at 34w4d who presents for routine OB visit. Overall doing well.     Problem List Items Addressed This Visit          Endocrine and Metabolic    Gestational diabetes mellitus (GDM) controlled on oral hypoglycemic drug, antepartum ([facility])    Relevant Medications    metFORMIN 500 MG Oral Tab    Other Relevant Orders    Fetal Non Stress Test [33062] (Completed)    Maternal Fetal Medicine Referral - [place] Location       Genitourinary and Reproductive    Urinary tract infection in mother during pregnancy, antepartum ([facility])       Gravid and     Advanced maternal age in multigravida ([facility])    Relevant Medications    metFORMIN 500 MG Oral Tab    Other Relevant Orders    Maternal Fetal Medicine Referral - [place] Location    Supervision of other normal pregnancy, antepartum ([facility]) - Primary    Relevant Medications    metFORMIN 500 MG Oral Tab    Obesity complicating pregnancy ([facility])    Relevant Medications    metFORMIN 500 MG Oral Tab    Other Relevant Orders    Maternal Fetal Medicine Referral - [place] Location     Other Visit Diagnoses       Gestational diabetes mellitus (GDM) in third trimester controlled on oral hypoglycemic drug ([facility])        Relevant Medications    metFORMIN 500 MG Oral Tab    OneTouch Delica Lancets 33G Does not apply Misc                Plan:     Patient Active Problem List    Diagnosis    Gestational diabetes mellitus (GDM) controlled on oral hypoglycemic drug, antepartum ([facility])     10/20/2024- elevated 2/4- referred to DM Education  Saw DM education 2024 - will need to initiate insulin therapy - new Mfm referral sent along with message to RD.   24 Saw MFM today; did not start insulin; recommended continued diet  control; per pt blood sugars have been normal EFW 1884 g ( 4 lb 2 oz); 37% at 32 weeks  - reviewed logs and fasting still elev but better pp control; will start Metformin 500 mg daily; will need weekly NST; and twice weekly NSTs at 34 weeks.   2024: fasting still elevated, postprandials within target parameters -> Will increase Metformin to 500mg BID; continue weekly NST and twice weekly NST at 34 weeks  24: Increase metformin to 500 mg QAM and 1000 mg QPM  [ ] MFM consult for insulin reviewed and d/w patient       Urinary tract infection in mother during pregnancy, antepartum ([facility])     Klebsiella --> oral antibiotics   [x] repeat urine culture was normal      Supervision of other normal pregnancy, antepartum ([facility])    Obesity complicating pregnancy ([facility])     [x] early 1 hr GTT- elevated but normal early 3 hour GTT      History of      X2  [ ] repeat  scheduled 2025 at 07:30 am   Patient desires permanent sterilization with bilateral salpingectomy at time of C/S       Advanced maternal age in multigravida ([facility])     Low dose ASA  [ x] MFM consult  [X] Follow-up growth & BPP ultrasound at 32 weeks gestation  [X ] NIPT wnl         - continue routine prenatal care   - labor and rupture of membrane precautions provided  - Fetal movement instructions given  - glucose log reviewed: 5/7 elevated fasting and PP overall wnl. Recommend increased dose of metformin and MFM consult.     Return to clinic in 2 weeks for KULWINDER visit         Note to patient and family   The  [employer] Cures Act makes medical notes available to patients in the interest of transparency.  However, please be advised that this is a medical document.  It is intended as jwdf-do-qlze communication.  It is written and medical language may contain abbreviations or verbiage that are technical and unfamiliar.  It may appear blunt or direct.  Medical documents are intended to carry relevant information, facts as evident, and the  clinical opinion of the practitioner.

## 2024-12-18 ENCOUNTER — TELEPHONE (OUTPATIENT)
Dept: PERINATAL CARE | Facility: HOSPITAL | Age: 39
End: 2024-12-18

## 2024-12-18 DIAGNOSIS — O24.415 GESTATIONAL DIABETES MELLITUS (GDM) CONTROLLED ON ORAL HYPOGLYCEMIC DRUG, ANTEPARTUM (HCC): ICD-10-CM

## 2024-12-18 NOTE — TELEPHONE ENCOUNTER
34w6d  Received BS log for date range 12/11-12/16     Low  High Out of Range   Fasting Blood Sugar 90 99 5/6   Post Breakfast 89 110 0/6   Post Lunch 96 115 0/6   Post Dinner 94 113 0/6     Patient was taking Metformin 500mg BID. OB doctor increased dose to 500mg q AM, 1000mg q PM on 12/16  Blood sugars on 12/17: 93/98/110/103 (unsure if pt increased dose, not answering her phone this morning)    To Dr. Billy for review.

## 2024-12-20 ENCOUNTER — MED REC SCAN ONLY (OUTPATIENT)
Dept: OBGYN CLINIC | Facility: CLINIC | Age: 39
End: 2024-12-20

## 2024-12-23 ENCOUNTER — ROUTINE PRENATAL (OUTPATIENT)
Dept: OBGYN CLINIC | Facility: CLINIC | Age: 39
End: 2024-12-23
Payer: COMMERCIAL

## 2024-12-23 ENCOUNTER — APPOINTMENT (OUTPATIENT)
Dept: OBGYN CLINIC | Facility: CLINIC | Age: 39
End: 2024-12-23
Payer: COMMERCIAL

## 2024-12-23 VITALS
DIASTOLIC BLOOD PRESSURE: 62 MMHG | SYSTOLIC BLOOD PRESSURE: 104 MMHG | WEIGHT: 203 LBS | HEART RATE: 93 BPM | HEIGHT: 63 IN | BODY MASS INDEX: 35.97 KG/M2

## 2024-12-23 DIAGNOSIS — O09.523 MULTIGRAVIDA OF ADVANCED MATERNAL AGE IN THIRD TRIMESTER (HCC): ICD-10-CM

## 2024-12-23 DIAGNOSIS — O24.415 GESTATIONAL DIABETES MELLITUS (GDM) CONTROLLED ON ORAL HYPOGLYCEMIC DRUG, ANTEPARTUM (HCC): ICD-10-CM

## 2024-12-23 DIAGNOSIS — Z34.80 SUPERVISION OF OTHER NORMAL PREGNANCY, ANTEPARTUM (HCC): Primary | ICD-10-CM

## 2024-12-23 NOTE — PROGRESS NOTES
35w4d seen for routine OB visit.   Denies ctx, lof, vb. Reports good FM.    Patient Active Problem List   Diagnosis    History of     Advanced maternal age in multigravida ([facility])    Supervision of other normal pregnancy, antepartum ([facility])    Obesity complicating pregnancy ([facility])    Urinary tract infection in mother during pregnancy, antepartum ([facility])    Gestational diabetes mellitus (GDM) controlled on oral hypoglycemic drug, antepartum ([facility])        TW lb (0.454 kg)   Depression Scale Total: 0 (2024  8:23 AM)      Gen: AAOx3, NAD  Resp: breathing comfortably  Abdomen: gravid, soft, nontender  Ext: nontender, no edema    NST - baseline 140, moderate variability, +15x15 accels, no decels  [facility] - quiet     Plan:  - NST reactive, continue weekly  - blood sugars reviewed, largely wnl  - 3T labs neg  - s/p tdap, desires RSV but we do not have any in office today. Advised to go to local pharmacy.  - return precautions reviewed    RTO in 1 week(s).

## 2024-12-27 ENCOUNTER — NURSE ONLY (OUTPATIENT)
Dept: OBGYN CLINIC | Facility: CLINIC | Age: 39
End: 2024-12-27
Payer: COMMERCIAL

## 2024-12-27 VITALS
DIASTOLIC BLOOD PRESSURE: 60 MMHG | BODY MASS INDEX: 36.32 KG/M2 | SYSTOLIC BLOOD PRESSURE: 106 MMHG | HEIGHT: 63 IN | WEIGHT: 205 LBS

## 2024-12-27 DIAGNOSIS — O09.523 MULTIGRAVIDA OF ADVANCED MATERNAL AGE IN THIRD TRIMESTER (HCC): Primary | ICD-10-CM

## 2024-12-27 PROCEDURE — 59025 FETAL NON-STRESS TEST: CPT | Performed by: OBSTETRICS & GYNECOLOGY

## 2024-12-30 ENCOUNTER — APPOINTMENT (OUTPATIENT)
Dept: OBGYN CLINIC | Facility: CLINIC | Age: 39
End: 2024-12-30
Payer: COMMERCIAL

## 2024-12-30 ENCOUNTER — ROUTINE PRENATAL (OUTPATIENT)
Dept: OBGYN CLINIC | Facility: CLINIC | Age: 39
End: 2024-12-30
Payer: COMMERCIAL

## 2024-12-30 VITALS
SYSTOLIC BLOOD PRESSURE: 110 MMHG | DIASTOLIC BLOOD PRESSURE: 64 MMHG | WEIGHT: 202.5 LBS | BODY MASS INDEX: 35.88 KG/M2 | HEART RATE: 109 BPM | HEIGHT: 63 IN

## 2024-12-30 DIAGNOSIS — O23.40 URINARY TRACT INFECTION IN MOTHER DURING PREGNANCY, ANTEPARTUM (HCC): ICD-10-CM

## 2024-12-30 DIAGNOSIS — Z34.90 ENCOUNTER FOR SUPERVISION OF NORMAL PREGNANCY, ANTEPARTUM, UNSPECIFIED GRAVIDITY (HCC): Primary | ICD-10-CM

## 2024-12-30 DIAGNOSIS — O09.523 MULTIGRAVIDA OF ADVANCED MATERNAL AGE IN THIRD TRIMESTER (HCC): ICD-10-CM

## 2024-12-30 DIAGNOSIS — O99.213 OBESITY AFFECTING PREGNANCY IN THIRD TRIMESTER, UNSPECIFIED OBESITY TYPE (HCC): ICD-10-CM

## 2024-12-30 DIAGNOSIS — N76.0 VAGINITIS AND VULVOVAGINITIS: ICD-10-CM

## 2024-12-30 DIAGNOSIS — Z98.891 HISTORY OF C-SECTION: ICD-10-CM

## 2024-12-30 DIAGNOSIS — Z34.80 SUPERVISION OF OTHER NORMAL PREGNANCY, ANTEPARTUM (HCC): ICD-10-CM

## 2024-12-30 DIAGNOSIS — O24.415 GESTATIONAL DIABETES MELLITUS (GDM) CONTROLLED ON ORAL HYPOGLYCEMIC DRUG, ANTEPARTUM (HCC): ICD-10-CM

## 2024-12-30 DIAGNOSIS — O24.415 GESTATIONAL DIABETES MELLITUS (GDM) IN THIRD TRIMESTER CONTROLLED ON ORAL HYPOGLYCEMIC DRUG (HCC): ICD-10-CM

## 2024-12-30 PROCEDURE — 87081 CULTURE SCREEN ONLY: CPT | Performed by: STUDENT IN AN ORGANIZED HEALTH CARE EDUCATION/TRAINING PROGRAM

## 2024-12-30 PROCEDURE — 87150 DNA/RNA AMPLIFIED PROBE: CPT | Performed by: STUDENT IN AN ORGANIZED HEALTH CARE EDUCATION/TRAINING PROGRAM

## 2024-12-30 PROCEDURE — 81514 NFCT DS BV&VAGINITIS DNA ALG: CPT | Performed by: STUDENT IN AN ORGANIZED HEALTH CARE EDUCATION/TRAINING PROGRAM

## 2024-12-30 NOTE — PATIENT INSTRUCTIONS
Labor Instructions    How do I know if it’s true labor?  One of the most important aspects of any pregnancy is being able to recognize the onset of labor.  Unfortunately, on occasion it can be difficult or confusing, especially if you have had one or more children.  Each labor can begin in a different way even if you have had four or five children.    If this is your first child, it is very common to have labor for an average greater than 10 hours; however, there have been rare instances for labor to be two hours or less for a first time mother. It is more important for you to know if this is your second or third baby to realize that any labor after the first is usually shorter.  There is no way to tell how long or short the labor will be. Therefore, please call us if you are unsure labor has started.       Usually, during the last six weeks of pregnancy, Kevan-Kamara contractions or “false labor pains occur”.  False labor is generally not very painful though it is not always easy to tell.  You may feel contractions, cramps or uterine tightening somewhere between every 3-30 minutes but they will not continue to get stronger over time.  If you lie down, drink plenty of fluid or walk around, the contractions may go away.   False contractions are very common if you have been active on your feet for several hours.   Women frequently worry about being sent home from the hospital without having their baby (i.e. the labor stopped).  Actually, this is an unnecessary worry, for this is an infrequent occurrence.     True labor usually begins in one of two ways.  In most patients it begins with contractions of the uterus, which are irregular (but not always) in the beginning.  They are cramp-like in character and feel similar to menstrual cramps.  After a while, they become more regular, and they seem to last a little longer, and feel a little sharper.  These symptoms are very important-more important- than the timing of the  contractions.  Having regular (usually closer), longer lasting (35-70 seconds), and sharper (more painful) contractions are the common symptoms of actual labor.  The second way in which labor can begin which occurs in approximately 30% of all patients is the rupture of the bag of water.  This is a sudden gush of watery fluid, usually sufficient to run down your leg and onto the floor, or you may wet a large area of the bed if it happens at night.  There may also be tricking that is uncontrolled.  If you are unsure, please call the office.      When should I call?  When contractions are strong and every 3-5 minutes.  If you have a gush of water or you think you might be leaking fluid.  If you are bleeding heavily.  If your baby is not moving around at least every 1-2 hours.  If you are worried about something.  When you think you are ready to go to the hospital.    Who do I call?  Call the office at [phone].  If the office is closed, the answering service will send a message to the physician on call.  The on call physician will be available for emergency phone calls only.          Can I eat in labor?  It is good to eat a light meal at home before going to the hospital.  Eat foods like crackers, popsicles, soup and fruit.  Avoid foods that are difficult to digest like meat, a lot of dairy products and high fat foods.  DO NOT EAT if you know you are scheduled for a  ().      What will happen when I get to the hospital?  When you arrive at the hospital, you will be admitted and examined.   There are a few factors that will determine if you will be allowed to be up out of bed or if you would need to stay in bed.  The main factors are how well the baby is entering into the pelvis and if the bag of he is in intact or ruptured.  An intravenous (IV) solution will, with exceptions, be started.  This is extremely important especially for the baby.   Your  will be allowed in the room during your  labor. During the delivery, the nurses will inform you of the hospital policy and how many coaches are allowed.  You may desire pain medication or anesthesia for pain.  You probably discussed some aspects of pain medication with us during your prenatal care.  The various options may also have been discussed in Prenatal Classes.  We utilize IV narcotics and epidural anesthesia when our patients request to have them.  If you chose to have no anesthesia, none will be administered.  A local anesthetic may be used at the time of delivery      What should I to bring to the hospital?  Maternity clothes to go home in  You can bring your own night gown to wear after giving birth, but most women prefer to wear the hospital gown because it may get soiled  Nursing Bra if you are planning to breastfeed  Clothes for your baby to go home in  Baby Car Seat.  Be sure you know how to install it correctly. Please install it before going to the hospital  Routine toiletries like toothbrush, shampoo, hairbrush and etc.   You can bring your favorite pillow, but please put a colored pillow case on it so it doesn’t get mixed up with hospital pillows    How long will I stay in the hospital?  The date you leave the hospital may vary depending on the speed of your recovery.  If you have a vaginal delivery, you will stay in the hospital 24-48 hours after your delivery as long as you aren’t having any complications.    If you have had a , you will stay in the hospital 48-72 hours as long as you aren’t having any complications.       Going Home Instructions  There are no set rules as to what you may do each day or week after you are home.  You will receive discharge instructions to help you each day.  Remember, early ambulation in the hospital is to prevent complications.  Do not let this lull you into a false sense of strength or ability to do certain physical acts which may tire you excessively.  Please call the office within a few days  after you are discharged from the hospital to schedule your post-partum visit, which is usually 4-6 weeks after delivery.    Any medications necessary will be discussed on an individual basis.  If you decide to breastfeed your baby, you should continue your prenatal vitamins.  If you do not breastfeed, simply finish the prenatal vitamins you have.      The staff at [facility] wish you a joyous and exciting birth.  If there is anything we can do to make this a better experience for you please do not hesitate to ask.

## 2024-12-30 NOTE — PROGRESS NOTES
KULWINDER    GA: 36w4d  Vitals:    24 1110   BP: 110/64   Pulse: 109   Weight: 202 lb 8 oz (91.9 kg)   Height: 63\"       Doing well, +FM, denies VB or uctx    Pt reports 3 day h/o increased leaking of clear fluid, no itching or burning.   SSE with some pooling, negative nitrazine, negative valsalva  Vaginitis swab collected    Glucose logs reviewed, fasting ~50% elevated. Current regimen is PO Metformin 500mg qAM and 1000mg at bedtime. Advised increasing AM dosage to 1000mg for better glucose control in AM. 2hr pp values are within parameters.     SVE C/L/H, firm, and posterior     Assessment:     Sandra Omer is a 39 year old  at 36w4d who presents for routine OB visit. Overall doing well.     Problem List Items Addressed This Visit       History of     Advanced maternal age in multigravida ([facility])    Relevant Orders    FETAL NON-STRESS TEST [facility] ONLY 15900 (Completed)    Supervision of other normal pregnancy, antepartum ([facility])    Obesity complicating pregnancy ([facility])    Urinary tract infection in mother during pregnancy, antepartum ([facility])    Gestational diabetes mellitus (GDM) controlled on oral hypoglycemic drug, antepartum ([facility])     Other Visit Diagnoses       Encounter for supervision of normal pregnancy, antepartum, unspecified  ([facility])    -  Primary    Relevant Orders    Group B Strep PCR    Gestational diabetes mellitus (GDM) in third trimester controlled on oral hypoglycemic drug ([facility])        Relevant Orders    FETAL NON-STRESS TEST [facility] ONLY 66139 (Completed)    Vaginitis and vulvovaginitis        Relevant Orders    Vaginitis Vaginosis PCR Panel                Plan:     Patient Active Problem List    Diagnosis    Gestational diabetes mellitus (GDM) controlled on oral hypoglycemic drug, antepartum ([facility])     10/20/2024- elevated 2/4- referred to DM Education  Saw DM education 2024 - will need to initiate insulin therapy - new Mfm referral sent along with  message to RD.   24 Saw MFM today; did not start insulin; recommended continued diet control; per pt blood sugars have been normal EFW 1884 g ( 4 lb 2 oz); 37% at 32 weeks  - reviewed logs and fasting still elev but better pp control; will start Metformin 500 mg daily; will need weekly NST; and twice weekly NSTs at 34 weeks.   2024: fasting still elevated, postprandials within target parameters -> Will increase Metformin to 500mg BID; continue weekly NST and twice weekly NST at 34 weeks  24: Increase metformin to 500 mg QAM and 1000 mg QPM  24: Increase Metformin to 1000 mg QAM and continue 1000 mg QPM  [ ] MFM consult for insulin reviewed and d/w patient       Urinary tract infection in mother during pregnancy, antepartum ([facility])     Klebsiella --> oral antibiotics   [x] repeat urine culture was normal      Supervision of other normal pregnancy, antepartum ([facility])    Obesity complicating pregnancy ([facility])     [x] early 1 hr GTT- elevated but normal early 3 hour GTT      History of      X2  [ ] repeat  scheduled 2025 at 07:30 am   Patient desires permanent sterilization with bilateral salpingectomy at time of C/S       Advanced maternal age in multigravida ([facility])     Low dose ASA  [ x] MFM consult  [X] Follow-up growth & BPP ultrasound at 32 weeks gestation  [X ] NIPT wnl         - continue routine prenatal care   - labor and rupture of membrane precautions provided  GBS No results found for: \"GBS\" collected today   Fetal movement count given  Labor precautions provided   Continue twice weekly NSTs  Return to clinic in 1 week for KULWINDER visit w/ NST. NST reactive and reassuring today      Adelaide Negron, DO  [facility] - OBGYN      Note to patient and family   The 21st Century Cures Act makes medical notes available to patients in the interest of transparency.  However, please be advised that this is a medical document.  It is intended as qzgo-mx-cyed communication.  It is  written and medical language may contain abbreviations or verbiage that are technical and unfamiliar.  It may appear blunt or direct.  Medical documents are intended to carry relevant information, facts as evident, and the clinical opinion of the practitioner.

## 2025-01-01 LAB
BV BACTERIA DNA VAG QL NAA+PROBE: NEGATIVE
C GLABRATA DNA VAG QL NAA+PROBE: NEGATIVE
C KRUSEI DNA VAG QL NAA+PROBE: NEGATIVE
CANDIDA DNA VAG QL NAA+PROBE: POSITIVE
T VAGINALIS DNA VAG QL NAA+PROBE: NEGATIVE

## 2025-01-02 ENCOUNTER — ORDERS FOR ADMISSION (OUTPATIENT)
Dept: OBGYN UNIT | Facility: HOSPITAL | Age: 40
End: 2025-01-02

## 2025-01-02 LAB — GROUP B STREP BY PCR FOR PCR OVT: NEGATIVE

## 2025-01-03 ENCOUNTER — NURSE ONLY (OUTPATIENT)
Dept: OBGYN CLINIC | Facility: CLINIC | Age: 40
End: 2025-01-03
Payer: COMMERCIAL

## 2025-01-03 ENCOUNTER — OFFICE VISIT (OUTPATIENT)
Dept: PERINATAL CARE | Facility: HOSPITAL | Age: 40
End: 2025-01-03
Attending: OBSTETRICS & GYNECOLOGY
Payer: COMMERCIAL

## 2025-01-03 ENCOUNTER — TELEPHONE (OUTPATIENT)
Dept: OBGYN CLINIC | Facility: CLINIC | Age: 40
End: 2025-01-03

## 2025-01-03 VITALS
WEIGHT: 202 LBS | BODY MASS INDEX: 35.79 KG/M2 | DIASTOLIC BLOOD PRESSURE: 73 MMHG | HEART RATE: 95 BPM | HEIGHT: 63 IN | SYSTOLIC BLOOD PRESSURE: 108 MMHG

## 2025-01-03 VITALS
HEART RATE: 89 BPM | BODY MASS INDEX: 35.79 KG/M2 | WEIGHT: 202 LBS | HEIGHT: 63 IN | SYSTOLIC BLOOD PRESSURE: 112 MMHG | DIASTOLIC BLOOD PRESSURE: 64 MMHG

## 2025-01-03 DIAGNOSIS — O28.8 NON-REACTIVE NST (NON-STRESS TEST): Primary | ICD-10-CM

## 2025-01-03 DIAGNOSIS — O28.8 NST (NON-STRESS TEST) NONREACTIVE: ICD-10-CM

## 2025-01-03 DIAGNOSIS — O24.419 GDM, CLASS A2 (HCC): ICD-10-CM

## 2025-01-03 DIAGNOSIS — O24.419 GDM (GESTATIONAL DIABETES MELLITUS) (HCC): Primary | ICD-10-CM

## 2025-01-03 DIAGNOSIS — O28.8 NON-REACTIVE NST (NON-STRESS TEST): ICD-10-CM

## 2025-01-03 DIAGNOSIS — O24.415 GESTATIONAL DIABETES MELLITUS (GDM) CONTROLLED ON ORAL HYPOGLYCEMIC DRUG, ANTEPARTUM (HCC): Primary | ICD-10-CM

## 2025-01-03 PROCEDURE — 59025 FETAL NON-STRESS TEST: CPT | Performed by: OBSTETRICS & GYNECOLOGY

## 2025-01-03 PROCEDURE — 76816 OB US FOLLOW-UP PER FETUS: CPT | Performed by: OBSTETRICS & GYNECOLOGY

## 2025-01-03 NOTE — PROGRESS NOTES
Outpatient Maternal-Fetal Medicine Follow-Up    Dear Dr. Sheppard    Thank you for requesting an ultrasound and maternal-fetal medicine consultation on your patient Sandra Omer.  As you are aware she is a 39 year old female  with a merchant pregnancy and an Estimated Date of Delivery: 25.  She returned to maternal-fetal medicine today for a follow-up visit.  Her history was reviewed from her prior visit and she had a NR NST today.    Antepartum Risk Factors  GDM A2  Obesity  AMA: low-risk cell free fetal DNA, declined invasive testing    PHYSICAL EXAMINATION:  /73 (BP Location: Right arm, Patient Position: Sitting, Cuff Size: adult)   Pulse 95   Ht 5' 3\" (1.6 m)   Wt 202 lb (91.6 kg)   LMP 2024 (Exact Date)   BMI 35.78 kg/m²   General: alert and oriented, no acute distress  Abdomen: gravid, soft, non-tender  Extremities: non-tender, no edema    OBSTETRIC ULTRASOUND  The patient had a follow-up growth ultrasound today which revealed normal interval fetal growth, BPP 8/8.    Ultrasound Findings:  Single IUP in cephalic presentation.    Placenta is posterior.   A 3 vessel cord is noted.  Cardiac activity is present at 129 bpm  EFW 3310 g ( 7 lb 5 oz); 68% AC 96 th %ile.    SOCRATES is  13.1 cm.  MVP is 4.9 cm  BPP is 8/8.     The fetal measurements are consistent with established EDC. No gross ultrasound evidence of structural abnormalities are seen today. The patient understands that ultrasound cannot rule out all structural and chromosomal abnormalities.     See Imaging Tab For Complete Ultrasound Report  I interpreted the results and reviewed them with the patient.    DISCUSSION  During her visit we discussed and reviewed the following issues:  GESTATIONAL DIABETES - follow-up    Her capillary blood glucose records were reviewed on 2025;   The patient is presently taking Metformin 500mg in the morning  Metformin 1,000mg in the evening    Continued medication use and capillary  blood glucose assessments were reviewed as well as recommendations for serial growth ultrasounds and antepartum testing.      IMPRESSION:  IUP at 37w1d  AGA growth and BPP 8/8  GDM A2  Obesity  AMA: low-risk cell free fetal DNA, declined invasive testing    RECOMMENDATIONS:  Continue care with Dr. Sheppard  Continue four times daily capillary blood glucose assessments (fasting and 2 hour postprandial)  Weekly Maternal-Fetal Medicine review of capillary blood glucose values  Weekly NSTs at 32 weeks; twice weekly NST's at 34 weeks  Delivery at 38-39  weeks advised for medication controlled diabetes    Thank you for allowing me to participate in the care of your patient.  Please do not hesitate to contact me if additional questions or concerns arise.      Costa Caceres M.D.    30 minutes spent in review of records, patient consultation, documentation and coordination of care.  The relevant clinical matter(s) are summarized above.     Note to patient and family  The 21st Century Cures Act makes medical notes available to patients in the interest of transparency.  However, please be advised that this is a medical document.  It is intended as kzgg-vs-pohc communication.  It is written and medical language may contain abbreviations or verbiage that are technical and unfamiliar.  It may appear blunt or direct.  Medical documents are intended to carry relevant information, facts as evident, and the clinical opinion of the practitioner.

## 2025-01-03 NOTE — TELEPHONE ENCOUNTER
Services Used  [facility] #[id number]    Spoke to patient.  Test results and provider recommendations reviewed.  Patient instructed to call if symptoms persist after completing 7 day course treatment.  Patient verbalized understanding.

## 2025-01-03 NOTE — TELEPHONE ENCOUNTER
Shari Zhao MD  1/3/2025  1:07 PM CST Back to Top      Yeast infection noted.  Recommend treatment with Monistat OTC x 7 days.  Please notify the patient and provide  services.  GBS negative.         Kasia WALLIS,     Your screening for vaginal infections was positive for yeast infection but negative for other infections. Recall, that this infection is not sexually transmitted but can cause symptoms that you reported during your last office exam. Therefore, I would advise to start treatment.  Recommend treatment with Monistat over-the-counter for 7 days.  Please continue with the routine prenatal care. Please contact the office if you have further questions or concerns. Thank you.     Sincerely,  Dr. Shari Zhao  Written by Shari Zhao MD on 1/3/2025  1:07 PM CST  Seen by patient Sandra Omer on 1/3/2025  1:28 PM

## 2025-01-03 NOTE — PROGRESS NOTES
BP normal. Pt feels well. Nonreactive NST; but reassuring overall. Had one acceleration. Will send to [facility] for BPP.     Amarilis Orellana MD   [facility]

## 2025-01-06 ENCOUNTER — ROUTINE PRENATAL (OUTPATIENT)
Dept: OBGYN CLINIC | Facility: CLINIC | Age: 40
End: 2025-01-06
Payer: COMMERCIAL

## 2025-01-06 ENCOUNTER — APPOINTMENT (OUTPATIENT)
Dept: OBGYN CLINIC | Facility: CLINIC | Age: 40
End: 2025-01-06
Payer: COMMERCIAL

## 2025-01-06 VITALS
BODY MASS INDEX: 35.97 KG/M2 | HEART RATE: 90 BPM | HEIGHT: 63 IN | DIASTOLIC BLOOD PRESSURE: 60 MMHG | SYSTOLIC BLOOD PRESSURE: 106 MMHG | WEIGHT: 203 LBS

## 2025-01-06 DIAGNOSIS — O24.415 GESTATIONAL DIABETES MELLITUS (GDM) CONTROLLED ON ORAL HYPOGLYCEMIC DRUG, ANTEPARTUM (HCC): Primary | ICD-10-CM

## 2025-01-06 DIAGNOSIS — O09.523 MULTIGRAVIDA OF ADVANCED MATERNAL AGE IN THIRD TRIMESTER (HCC): ICD-10-CM

## 2025-01-06 PROBLEM — Z34.80 SUPERVISION OF OTHER NORMAL PREGNANCY, ANTEPARTUM (HCC): Status: RESOLVED | Noted: 2024-07-17 | Resolved: 2025-01-06

## 2025-01-06 NOTE — PROGRESS NOTES
37w4d seen for routine OB visit.   Denies ctx, lof, vb. Reports good FM.    Patient Active Problem List   Diagnosis    History of     Advanced maternal age in multigravida ([facility])    Supervision of other normal pregnancy, antepartum ([facility])    Obesity complicating pregnancy ([facility])    Urinary tract infection in mother during pregnancy, antepartum ([facility])    Gestational diabetes mellitus (GDM) controlled on oral hypoglycemic drug, antepartum ([facility])        TW lb (0.454 kg)   Depression Scale Total: 0 (2024  8:23 AM)      Gen: AAOx3, NAD  Resp: breathing comfortably  Abdomen: gravid, soft, nontender  Ext: nontender, no edema    NST - baseline 145, moderate variability, +15x15 accels, no decels  [place] - rare ctx    Plan:  - NST reactive, continue twice weekly  - MFM following blood sugars - continues on Metformin  - GBS neg, 3T labs neg  - repeat CS scheduled , with BS  - return precautions reviewed      Chaperone offered and declined.

## 2025-01-08 ENCOUNTER — TELEPHONE (OUTPATIENT)
Dept: OBGYN UNIT | Facility: HOSPITAL | Age: 40
End: 2025-01-08

## 2025-01-10 ENCOUNTER — NURSE ONLY (OUTPATIENT)
Dept: OBGYN CLINIC | Facility: CLINIC | Age: 40
End: 2025-01-10
Payer: COMMERCIAL

## 2025-01-10 ENCOUNTER — TELEPHONE (OUTPATIENT)
Dept: OBGYN UNIT | Facility: HOSPITAL | Age: 40
End: 2025-01-10

## 2025-01-10 VITALS
SYSTOLIC BLOOD PRESSURE: 114 MMHG | BODY MASS INDEX: 36.14 KG/M2 | HEIGHT: 63 IN | DIASTOLIC BLOOD PRESSURE: 66 MMHG | HEART RATE: 86 BPM | WEIGHT: 204 LBS

## 2025-01-10 DIAGNOSIS — O99.213 OBESITY AFFECTING PREGNANCY IN THIRD TRIMESTER, UNSPECIFIED OBESITY TYPE (HCC): ICD-10-CM

## 2025-01-10 DIAGNOSIS — O09.523 MULTIGRAVIDA OF ADVANCED MATERNAL AGE IN THIRD TRIMESTER (HCC): ICD-10-CM

## 2025-01-10 DIAGNOSIS — O24.415 GESTATIONAL DIABETES MELLITUS (GDM) CONTROLLED ON ORAL HYPOGLYCEMIC DRUG, ANTEPARTUM (HCC): Primary | ICD-10-CM

## 2025-01-10 PROCEDURE — 59025 FETAL NON-STRESS TEST: CPT | Performed by: NURSE PRACTITIONER

## 2025-01-14 ENCOUNTER — ROUTINE PRENATAL (OUTPATIENT)
Dept: OBGYN CLINIC | Facility: CLINIC | Age: 40
End: 2025-01-14
Payer: COMMERCIAL

## 2025-01-14 ENCOUNTER — APPOINTMENT (OUTPATIENT)
Dept: OBGYN CLINIC | Facility: CLINIC | Age: 40
End: 2025-01-14
Payer: COMMERCIAL

## 2025-01-14 VITALS
SYSTOLIC BLOOD PRESSURE: 116 MMHG | BODY MASS INDEX: 36.32 KG/M2 | DIASTOLIC BLOOD PRESSURE: 64 MMHG | WEIGHT: 205 LBS | HEIGHT: 63 IN

## 2025-01-14 DIAGNOSIS — Z98.891 HISTORY OF C-SECTION: ICD-10-CM

## 2025-01-14 DIAGNOSIS — O24.415 GESTATIONAL DIABETES MELLITUS (GDM) CONTROLLED ON ORAL HYPOGLYCEMIC DRUG, ANTEPARTUM (HCC): ICD-10-CM

## 2025-01-14 DIAGNOSIS — O23.40 URINARY TRACT INFECTION IN MOTHER DURING PREGNANCY, ANTEPARTUM (HCC): ICD-10-CM

## 2025-01-14 DIAGNOSIS — Z34.80 SUPERVISION OF OTHER NORMAL PREGNANCY, ANTEPARTUM (HCC): Primary | ICD-10-CM

## 2025-01-14 DIAGNOSIS — O09.529 ANTEPARTUM MULTIGRAVIDA OF ADVANCED MATERNAL AGE (HCC): ICD-10-CM

## 2025-01-14 DIAGNOSIS — O99.213 OBESITY AFFECTING PREGNANCY IN THIRD TRIMESTER, UNSPECIFIED OBESITY TYPE (HCC): ICD-10-CM

## 2025-01-14 PROCEDURE — 59025 FETAL NON-STRESS TEST: CPT | Performed by: OBSTETRICS & GYNECOLOGY

## 2025-01-14 NOTE — PROGRESS NOTES
KULWINDER    GA: 38w5d  Vitals:    25 1048   BP: 116/64   Weight: 205 lb (93 kg)   Height: 63\"       Doing well, +FM  Denies LOF/VB/uctx  SVE 0/50/-3     Assessment:     Sandra Omer is a 39 year old  at 38w5d who presents for routine OB visit. Overall doing well.     Problem List Items Addressed This Visit          Endocrine and Metabolic    Gestational diabetes mellitus (GDM) controlled on oral hypoglycemic drug, antepartum ([facility])    Relevant Orders    Fetal Non Stress Test [94796]       Genitourinary and Reproductive    Urinary tract infection in mother during pregnancy, antepartum ([facility])       Gravid and     History of     Advanced maternal age in multigravida ([facility])    Relevant Orders    Fetal Non Stress Test [45366]    Supervision of other normal pregnancy, antepartum ([facility]) - Primary    Obesity complicating pregnancy ([facility])    Relevant Orders    Fetal Non Stress Test [98134]           Plan:     Patient Active Problem List    Diagnosis    Gestational diabetes mellitus (GDM) controlled on oral hypoglycemic drug, antepartum ([facility])     10/20/2024- elevated 2/4- referred to DM Education  Saw DM education 2024 - will need to initiate insulin therapy - new Mfm referral sent along with message to RD.   24 Saw MFM today; did not start insulin; recommended continued diet control; per pt blood sugars have been normal EFW 1884 g ( 4 lb 2 oz); 37% at 32 weeks  - reviewed logs and fasting still elev but better pp control; will start Metformin 500 mg daily; will need weekly NST; and twice weekly NSTs at 34 weeks.   2024: fasting still elevated, postprandials within target parameters -> Will increase Metformin to 500mg BID; continue weekly NST and twice weekly NST at 34 weeks  24: Increase metformin to 500 mg QAM and 1000 mg QPM  24: Increase Metformin to 1000 mg QAM and continue 1000 mg QPM  [x] MFM consult for insulin reviewed and d/w patient -  continued on Metformin  Continue four times daily capillary blood glucose assessments (fasting and 2 hour postprandial)  Weekly Maternal-Fetal Medicine review of capillary blood glucose values  Weekly NSTs at 32 weeks; twice weekly NST's at 34 weeks  Delivery at 38-39  weeks advised for medication controlled diabetes      Urinary tract infection in mother during pregnancy, antepartum ([facility])     Klebsiella --> oral antibiotics   [x] repeat urine culture was normal      Supervision of other normal pregnancy, antepartum ([facility])    Obesity complicating pregnancy ([facility])     [x] early 1 hr GTT- elevated but normal early 3 hour GTT  Continue four times daily capillary blood glucose assessments (fasting and 2 hour postprandial)  Weekly Maternal-Fetal Medicine review of capillary blood glucose values  Weekly NSTs at 32 weeks; twice weekly NST's at 34 weeks  Delivery at 38-39  weeks advised for medication controlled diabetes      History of      X2  [ ] repeat  scheduled 2025 at 07:30 am   Patient desires permanent sterilization with bilateral salpingectomy at time of C/S       Advanced maternal age in multigravida ([facility])     Low dose ASA  [ x] MFM consult  [X] Follow-up growth & BPP ultrasound at 32 weeks gestation  [X ] NIPT wnl         - continue routine prenatal care   - labor and rupture of membrane precautions provided  GBS   Lab Results   Component Value Date    GBS Negative 2024      Fetal movement count given  Labor precautions provided     NST reactive and reassuring     RCS 25      Note to patient and family   The 21st Century Cures Act makes medical notes available to patients in the interest of transparency.  However, please be advised that this is a medical document.  It is intended as ucpq-fa-irrk communication.  It is written and medical language may contain abbreviations or verbiage that are technical and unfamiliar.  It may appear blunt or direct.  Medical documents are intended  to carry relevant information, facts as evident, and the clinical opinion of the practitioner.

## 2025-01-14 NOTE — PATIENT INSTRUCTIONS
Labor Instructions    How do I know if it’s true labor?  One of the most important aspects of any pregnancy is being able to recognize the onset of labor.  Unfortunately, on occasion it can be difficult or confusing, especially if you have had one or more children.  Each labor can begin in a different way even if you have had four or five children.    If this is your first child, it is very common to have labor for an average greater than 10 hours; however, there have been rare instances for labor to be two hours or less for a first time mother. It is more important for you to know if this is your second or third baby to realize that any labor after the first is usually shorter.  There is no way to tell how long or short the labor will be. Therefore, please call us if you are unsure labor has started.       Usually, during the last six weeks of pregnancy, Kevan-Kamara contractions or “false labor pains occur”.  False labor is generally not very painful though it is not always easy to tell.  You may feel contractions, cramps or uterine tightening somewhere between every 3-30 minutes but they will not continue to get stronger over time.  If you lie down, drink plenty of fluid or walk around, the contractions may go away.   False contractions are very common if you have been active on your feet for several hours.   Women frequently worry about being sent home from the hospital without having their baby (i.e. the labor stopped).  Actually, this is an unnecessary worry, for this is an infrequent occurrence.     True labor usually begins in one of two ways.  In most patients it begins with contractions of the uterus, which are irregular (but not always) in the beginning.  They are cramp-like in character and feel similar to menstrual cramps.  After a while, they become more regular, and they seem to last a little longer, and feel a little sharper.  These symptoms are very important-more important- than the timing of the  contractions.  Having regular (usually closer), longer lasting (35-70 seconds), and sharper (more painful) contractions are the common symptoms of actual labor.  The second way in which labor can begin which occurs in approximately 30% of all patients is the rupture of the bag of water.  This is a sudden gush of watery fluid, usually sufficient to run down your leg and onto the floor, or you may wet a large area of the bed if it happens at night.  There may also be tricking that is uncontrolled.  If you are unsure, please call the office.      When should I call?  When contractions are strong and every 3-5 minutes.  If you have a gush of water or you think you might be leaking fluid.  If you are bleeding heavily.  If your baby is not moving around at least every 1-2 hours.  If you are worried about something.  When you think you are ready to go to the hospital.    Who do I call?  Call the office at [phone].  If the office is closed, the answering service will send a message to the physician on call.  The on call physician will be available for emergency phone calls only.          Can I eat in labor?  It is good to eat a light meal at home before going to the hospital.  Eat foods like crackers, popsicles, soup and fruit.  Avoid foods that are difficult to digest like meat, a lot of dairy products and high fat foods.  DO NOT EAT if you know you are scheduled for a  ().      What will happen when I get to the hospital?  When you arrive at the hospital, you will be admitted and examined.   There are a few factors that will determine if you will be allowed to be up out of bed or if you would need to stay in bed.  The main factors are how well the baby is entering into the pelvis and if the bag of he is in intact or ruptured.  An intravenous (IV) solution will, with exceptions, be started.  This is extremely important especially for the baby.   Your  will be allowed in the room during your  labor. During the delivery, the nurses will inform you of the hospital policy and how many coaches are allowed.  You may desire pain medication or anesthesia for pain.  You probably discussed some aspects of pain medication with us during your prenatal care.  The various options may also have been discussed in Prenatal Classes.  We utilize IV narcotics and epidural anesthesia when our patients request to have them.  If you chose to have no anesthesia, none will be administered.  A local anesthetic may be used at the time of delivery      What should I to bring to the hospital?  Maternity clothes to go home in  You can bring your own night gown to wear after giving birth, but most women prefer to wear the hospital gown because it may get soiled  Nursing Bra if you are planning to breastfeed  Clothes for your baby to go home in  Baby Car Seat.  Be sure you know how to install it correctly. Please install it before going to the hospital  Routine toiletries like toothbrush, shampoo, hairbrush and etc.   You can bring your favorite pillow, but please put a colored pillow case on it so it doesn’t get mixed up with hospital pillows    How long will I stay in the hospital?  The date you leave the hospital may vary depending on the speed of your recovery.  If you have a vaginal delivery, you will stay in the hospital 24-48 hours after your delivery as long as you aren’t having any complications.    If you have had a , you will stay in the hospital 48-72 hours as long as you aren’t having any complications.       Going Home Instructions  There are no set rules as to what you may do each day or week after you are home.  You will receive discharge instructions to help you each day.  Remember, early ambulation in the hospital is to prevent complications.  Do not let this lull you into a false sense of strength or ability to do certain physical acts which may tire you excessively.  Please call the office within a few days  after you are discharged from the hospital to schedule your post-partum visit, which is usually 4-6 weeks after delivery.    Any medications necessary will be discussed on an individual basis.  If you decide to breastfeed your baby, you should continue your prenatal vitamins.  If you do not breastfeed, simply finish the prenatal vitamins you have.      The staff at [facility] wish you a joyous and exciting birth.  If there is anything we can do to make this a better experience for you please do not hesitate to ask.

## 2025-01-16 ENCOUNTER — TELEPHONE (OUTPATIENT)
Dept: OBGYN UNIT | Facility: HOSPITAL | Age: 40
End: 2025-01-16

## 2025-01-16 NOTE — H&P
[facility]  Obstetrics and Gynecology  History & Physical    Sandra Omer Patient Status:  Surgery Admit - Inpt    1985 MRN [id number]   Location [facility] LABOR & DELIVERY Attending Luiza Marti MD   Hospital Day 0 PCP None Pcp     CC: Patient is here for  section     SUBJECTIVE:    Sandra Omer is a 39 year old  female at 39w0d Estimated Date of Delivery: 25 who is being admitted for repeat  section and bilateral salpingectomy. Her current obstetrical history is significant for GDMA2 on Metformin, hx of two prior c-sections, desire for sterilization, AMA. Denies regular contractions, LOF, bleeding. + FM. Denies vision changes, SOB, chest pain, RUQ pain, or headaches    Discussed risks/benefits of  section including risk of bleeding and infection, need for medications, surgical interventions including hysterectomy and blood transfusion to control bleeding. Discussed with the patient was that risk of transfusion include counseled to the risks of bleeding from the procedure including subsequent risk of yaw diseases from the transfusion including HIV (1:2,000,000) and hepatitis (1:30,000). Discussed risk of injury to surrounding organs including bowel and bladder.      WILFREDO Confirmation  LMP: Patient's last menstrual period was 2024 (exact date).  WILFREDO: 2025, by Last Menstrual Period     Current pregnancy complications:  Patient Active Problem List    Diagnosis    Pregnancy (HCC)    Gestational diabetes mellitus (GDM) controlled on oral hypoglycemic drug, antepartum ([facility])     10/20/2024- elevated 2/4- referred to DM Education  Saw DM education 2024 - will need to initiate insulin therapy - new Mfm referral sent along with message to RD.   24 Saw MFM today; did not start insulin; recommended continued diet control; per pt blood sugars have been normal EFW 1884 g ( 4 lb 2 oz); 37% at 32  weeks  - reviewed logs and fasting still elev but better pp control; will start Metformin 500 mg daily; will need weekly NST; and twice weekly NSTs at 34 weeks.   2024: fasting still elevated, postprandials within target parameters -> Will increase Metformin to 500mg BID; continue weekly NST and twice weekly NST at 34 weeks  24: Increase metformin to 500 mg QAM and 1000 mg QPM  24: Increase Metformin to 1000 mg QAM and continue 1000 mg QPM  [x] MFM consult for insulin reviewed and d/w patient - continued on Metformin  Continue four times daily capillary blood glucose assessments (fasting and 2 hour postprandial)  Weekly Maternal-Fetal Medicine review of capillary blood glucose values  Weekly NSTs at 32 weeks; twice weekly NST's at 34 weeks  Delivery at 38-39  weeks advised for medication controlled diabetes      Urinary tract infection in mother during pregnancy, antepartum ([facility])     Klebsiella --> oral antibiotics   [x] repeat urine culture was normal      Supervision of other normal pregnancy, antepartum ([facility])    Obesity complicating pregnancy ([facility])     [x] early 1 hr GTT- elevated but normal early 3 hour GTT  Continue four times daily capillary blood glucose assessments (fasting and 2 hour postprandial)  Weekly Maternal-Fetal Medicine review of capillary blood glucose values  Weekly NSTs at 32 weeks; twice weekly NST's at 34 weeks  Delivery at 38-39  weeks advised for medication controlled diabetes      History of      X2  [ ] repeat  scheduled 2025 at 07:30 am   Patient desires permanent sterilization with bilateral salpingectomy at time of C/S       Advanced maternal age in multigravida (HCC)     Low dose ASA  [ x] MFM consult  [X] Follow-up growth & BPP ultrasound at 32 weeks gestation  [X ] NIPT wnl         Obstetric History:   OB History    Para Term  AB Living   3 2 2 0 0 3   SAB IAB Ectopic Multiple Live Births   0 0 0 1 3      # Outcome Date GA Lbr  Nixon/2nd Weight Sex Type Anes PTL Lv   3 Current            2 Term  41w0d   M Caesarean   ORLY   1A Term  38w0d   M Caesarean   ORLY   1B Term  38w0d   F Caesarean   ORLY     Past Medical History:   Past Medical History:    Gestational diabetes (HCC)    Supervision of other normal pregnancy, antepartum (HCC)     Past Social History:   Past Surgical History:   Procedure Laterality Date          Hand surgery Left      Family History:   Family History   Problem Relation Age of Onset    No Known Problems Mother     No Known Problems Father      Social History:   Social History     Tobacco Use    Smoking status: Never    Smokeless tobacco: Never   Substance Use Topics    Alcohol use: Not Currently       Home Meds: Prescriptions Prior to Admission[1]  Allergies: Allergies[2]    OBJECTIVE:    Temp:  [97.7 °F (36.5 °C)] 97.7 °F (36.5 °C)  Pulse:  [89] 89  Resp:  [18] 18  BP: (120)/(76) 120/76  Body mass index is 36.31 kg/m².    General: AAO. NAD.   Lungs: no tachypnea, retractions or cyanosis  CV:  normal peripheral perfusion   Abdomen: FHT present, gravid   Extremities: negative edema bilaterally  FHT: moderate variability/120 BPM / Positive accelerations/Negative decelerations   TOCO: none    SVE: deferred    Estimated Fetal Weight: 3,310 g 70%ile 1/3/2025  Presentation: Cephalic by Leopold's  Placenta location: Posterior      Prenatal Labs Brief Review   Blood Type:   Lab Results   Component Value Date    ABO O 2025    RH Positive 2025         Inpatient labs:  Lab Results   Component Value Date    WBC 11.6 2025    HGB 11.1 2025    HCT 35.4 2025    .0 2025       ASSESSMENT/ PLAN:    Sandra Omer is a 39 year old  female at 39w0d Estimated Date of Delivery: 25 who is being admitted for repeat  section and bilateral salpingectomy    Preoperative:   - Preoperative antibiotics: 2g Cefoxitin   - LABS: CBC: Pre-Op Hgb 11.1, Type and Screen  - IVF: 1  L LR Bolus then at 125 cc/hr  - DIET: NPO   - Hx of  x 2  Fetal monitoring: CEFM  GBS: NEG  Pain: Anesthesia to evaluate    Desires sterilization  - Private insurance  - Is 100% sure she does not want any future pregnancies; knows this is irreversible; discussed salpingectomy for decreasing ovarian cancer risk    Comorbidities  - GDMA2: Metformin 1000 mg BID; EFW 3,310 g 70%ile 1/3/25; BS this AM 97  - AMA cfDNA low risk    Risks, benefits, alternatives and possible complications have been discussed in detail with the patient including but not limited to bleeding, infection, injury to local organs (bowel/ bladder). Pt desires to proceed w/ surgery as scheduled. Pre-admission, admission, and post admission procedures and expectations were discussed in detail.  All questions answered, all appropriate consents will be signed at the Hospital. Patient verbalized understanding and agreement. Admission is planned for today. Delivery via CS anticipated.    Amarilis Orellana MD   [facility] - OBGYN        Note to patient and family   The  [place] Cures Act makes medical notes available to patients in the interest of transparency.  However, please be advised that this is a medical document.  It is intended as wqfb-lv-lgru communication.  It is written and medical language may contain abbreviations or verbiage that are technical and unfamiliar.  It may appear blunt or direct.  Medical documents are intended to carry relevant information, facts as evident, and the clinical opinion of the practitioner.               [1]   Medications Prior to Admission   Medication Sig Dispense Refill Last Dose/Taking    metFORMIN 500 MG Oral Tab Take 1 tablet (500 mg total) by mouth every morning AND 2 tablets (1,000 mg total) every evening. Take 1 tablet by mouth in the morning and 2 tablets by mouth in the evening. 90 tablet 1 2025 Morning    Cholecalciferol (VITAMIN D3) 1.25 MG (94802 UT) Oral Cap TAKE 1 CAPSULE BY MOUTH ONCE PER  WEEK FOR 12 WEEKS.   1/16/2025 Morning    Prenatal Vit-Fe Fumarate-FA (PRENATAL VITAMINS OR) Take by mouth.   1/16/2025 Morning    OneTouch Delica Lancets 33G Does not apply Misc Check 4 times per day for gestational diabetes 100 each 3     metFORMIN 500 MG Oral Tab Take 1 tablet (500 mg total) by mouth at bedtime. 60 tablet 0     Blood Glucose Monitoring Suppl (ONETOUCH ULTRA 2) w/Device Does not apply Kit Use as directed 4x per day 1 kit 0     Glucose Blood (ONETOUCH VERIO) In Vitro Strip Check 4 times per day for gestational diabetes 100 strip 3     cefpodoxime 100 MG Oral Tab Take 1 tablet (100 mg total) by mouth 2 (two) times daily. (Patient not taking: Reported on 1/3/2025) 10 tablet 0    [2] No Known Allergies

## 2025-01-17 ENCOUNTER — ANESTHESIA EVENT (OUTPATIENT)
Dept: OBGYN UNIT | Facility: HOSPITAL | Age: 40
End: 2025-01-17
Payer: COMMERCIAL

## 2025-01-17 ENCOUNTER — HOSPITAL ENCOUNTER (INPATIENT)
Facility: HOSPITAL | Age: 40
LOS: 2 days | Discharge: HOME | End: 2025-01-19
Attending: OBSTETRICS & GYNECOLOGY | Admitting: OBSTETRICS & GYNECOLOGY
Payer: COMMERCIAL

## 2025-01-17 ENCOUNTER — ANESTHESIA (OUTPATIENT)
Dept: OBGYN UNIT | Facility: HOSPITAL | Age: 40
End: 2025-01-17
Payer: COMMERCIAL

## 2025-01-17 PROBLEM — Z98.891 HISTORY OF CESAREAN DELIVERY: Status: ACTIVE | Noted: 2024-06-19

## 2025-01-17 PROBLEM — Z34.90 PREGNANCY (HCC): Status: ACTIVE | Noted: 2025-01-17

## 2025-01-17 PROBLEM — Z30.2 REQUEST FOR STERILIZATION: Status: ACTIVE | Noted: 2025-01-17

## 2025-01-17 LAB
ANTIBODY SCREEN: NEGATIVE
BASOPHILS # BLD AUTO: 0.03 X10(3) UL (ref 0–0.2)
BASOPHILS NFR BLD AUTO: 0.3 %
EOSINOPHIL # BLD AUTO: 0.11 X10(3) UL (ref 0–0.7)
EOSINOPHIL NFR BLD AUTO: 0.9 %
ERYTHROCYTE [DISTWIDTH] IN BLOOD BY AUTOMATED COUNT: 15.3 %
GLUCOSE BLD-MCNC: 97 MG/DL (ref 70–99)
HCT VFR BLD AUTO: 35.4 %
HGB BLD-MCNC: 11.1 G/DL
IMM GRANULOCYTES # BLD AUTO: 0.04 X10(3) UL (ref 0–1)
IMM GRANULOCYTES NFR BLD: 0.3 %
LYMPHOCYTES # BLD AUTO: 2.31 X10(3) UL (ref 1–4)
LYMPHOCYTES NFR BLD AUTO: 19.9 %
MCH RBC QN AUTO: 24 PG (ref 26–34)
MCHC RBC AUTO-ENTMCNC: 31.4 G/DL (ref 31–37)
MCV RBC AUTO: 76.6 FL
MONOCYTES # BLD AUTO: 0.78 X10(3) UL (ref 0.1–1)
MONOCYTES NFR BLD AUTO: 6.7 %
NEUTROPHILS # BLD AUTO: 8.32 X10 (3) UL (ref 1.5–7.7)
NEUTROPHILS # BLD AUTO: 8.32 X10(3) UL (ref 1.5–7.7)
NEUTROPHILS NFR BLD AUTO: 71.9 %
PLATELET # BLD AUTO: 270 10(3)UL (ref 150–450)
RBC # BLD AUTO: 4.62 X10(6)UL
RH BLOOD TYPE: POSITIVE
T PALLIDUM AB SER QL IA: NONREACTIVE
WBC # BLD AUTO: 11.6 X10(3) UL (ref 4–11)

## 2025-01-17 PROCEDURE — 59514 CESAREAN DELIVERY ONLY: CPT | Performed by: OBSTETRICS & GYNECOLOGY

## 2025-01-17 PROCEDURE — 0UB70ZZ EXCISION OF BILATERAL FALLOPIAN TUBES, OPEN APPROACH: ICD-10-PCS | Performed by: OBSTETRICS & GYNECOLOGY

## 2025-01-17 PROCEDURE — 59510 CESAREAN DELIVERY: CPT | Performed by: OBSTETRICS & GYNECOLOGY

## 2025-01-17 PROCEDURE — 58611 LIGATE OVIDUCT(S) ADD-ON: CPT | Performed by: OBSTETRICS & GYNECOLOGY

## 2025-01-17 RX ORDER — BUPIVACAINE HYDROCHLORIDE 7.5 MG/ML
INJECTION, SOLUTION INTRASPINAL AS NEEDED
Status: DISCONTINUED | OUTPATIENT
Start: 2025-01-17 | End: 2025-01-17 | Stop reason: SURG

## 2025-01-17 RX ORDER — CITRIC ACID/SODIUM CITRATE 334-500MG
30 SOLUTION, ORAL ORAL ONCE
Status: COMPLETED | OUTPATIENT
Start: 2025-01-17 | End: 2025-01-17

## 2025-01-17 RX ORDER — NALOXONE HYDROCHLORIDE 0.4 MG/ML
0.08 INJECTION, SOLUTION INTRAMUSCULAR; INTRAVENOUS; SUBCUTANEOUS
Status: ACTIVE | OUTPATIENT
Start: 2025-01-17 | End: 2025-01-18

## 2025-01-17 RX ORDER — GABAPENTIN 300 MG/1
300 CAPSULE ORAL EVERY 8 HOURS PRN
Status: DISCONTINUED | OUTPATIENT
Start: 2025-01-17 | End: 2025-01-19

## 2025-01-17 RX ORDER — KETOROLAC TROMETHAMINE 30 MG/ML
30 INJECTION, SOLUTION INTRAMUSCULAR; INTRAVENOUS EVERY 6 HOURS
Status: DISPENSED | OUTPATIENT
Start: 2025-01-17 | End: 2025-01-18

## 2025-01-17 RX ORDER — ONDANSETRON 2 MG/ML
4 INJECTION INTRAMUSCULAR; INTRAVENOUS EVERY 6 HOURS PRN
Status: DISCONTINUED | OUTPATIENT
Start: 2025-01-17 | End: 2025-01-19

## 2025-01-17 RX ORDER — DEXTROSE, SODIUM CHLORIDE, SODIUM LACTATE, POTASSIUM CHLORIDE, AND CALCIUM CHLORIDE 5; .6; .31; .03; .02 G/100ML; G/100ML; G/100ML; G/100ML; G/100ML
INJECTION, SOLUTION INTRAVENOUS CONTINUOUS PRN
Status: DISCONTINUED | OUTPATIENT
Start: 2025-01-17 | End: 2025-01-19

## 2025-01-17 RX ORDER — ENOXAPARIN SODIUM 100 MG/ML
40 INJECTION SUBCUTANEOUS NIGHTLY
Status: DISCONTINUED | OUTPATIENT
Start: 2025-01-17 | End: 2025-01-19

## 2025-01-17 RX ORDER — NALBUPHINE HYDROCHLORIDE 10 MG/ML
2.5 INJECTION INTRAMUSCULAR; INTRAVENOUS; SUBCUTANEOUS
Status: DISCONTINUED | OUTPATIENT
Start: 2025-01-17 | End: 2025-01-17 | Stop reason: HOSPADM

## 2025-01-17 RX ORDER — HYDROMORPHONE HYDROCHLORIDE 1 MG/ML
0.4 INJECTION, SOLUTION INTRAMUSCULAR; INTRAVENOUS; SUBCUTANEOUS EVERY 5 MIN PRN
Status: DISCONTINUED | OUTPATIENT
Start: 2025-01-17 | End: 2025-01-17 | Stop reason: HOSPADM

## 2025-01-17 RX ORDER — DIPHENHYDRAMINE HYDROCHLORIDE 50 MG/ML
12.5 INJECTION INTRAMUSCULAR; INTRAVENOUS EVERY 4 HOURS PRN
Status: DISCONTINUED | OUTPATIENT
Start: 2025-01-17 | End: 2025-01-19

## 2025-01-17 RX ORDER — SODIUM CHLORIDE, SODIUM LACTATE, POTASSIUM CHLORIDE, CALCIUM CHLORIDE 600; 310; 30; 20 MG/100ML; MG/100ML; MG/100ML; MG/100ML
125 INJECTION, SOLUTION INTRAVENOUS CONTINUOUS
Status: DISCONTINUED | OUTPATIENT
Start: 2025-01-17 | End: 2025-01-17

## 2025-01-17 RX ORDER — DIPHENHYDRAMINE HCL 25 MG
25 CAPSULE ORAL EVERY 4 HOURS PRN
Status: DISCONTINUED | OUTPATIENT
Start: 2025-01-17 | End: 2025-01-19

## 2025-01-17 RX ORDER — ACETAMINOPHEN 500 MG
1000 TABLET ORAL ONCE
Status: COMPLETED | OUTPATIENT
Start: 2025-01-17 | End: 2025-01-17

## 2025-01-17 RX ORDER — IBUPROFEN 600 MG/1
600 TABLET, FILM COATED ORAL EVERY 6 HOURS
Status: DISCONTINUED | OUTPATIENT
Start: 2025-01-18 | End: 2025-01-19

## 2025-01-17 RX ORDER — ONDANSETRON 2 MG/ML
4 INJECTION INTRAMUSCULAR; INTRAVENOUS ONCE AS NEEDED
Status: DISCONTINUED | OUTPATIENT
Start: 2025-01-17 | End: 2025-01-17 | Stop reason: HOSPADM

## 2025-01-17 RX ORDER — DEXTROSE MONOHYDRATE 25 G/50ML
50 INJECTION, SOLUTION INTRAVENOUS
Status: DISCONTINUED | OUTPATIENT
Start: 2025-01-17 | End: 2025-01-17 | Stop reason: HOSPADM

## 2025-01-17 RX ORDER — KETOROLAC TROMETHAMINE 30 MG/ML
30 INJECTION, SOLUTION INTRAMUSCULAR; INTRAVENOUS ONCE
Status: COMPLETED | OUTPATIENT
Start: 2025-01-17 | End: 2025-01-17

## 2025-01-17 RX ORDER — HYDROMORPHONE HYDROCHLORIDE 1 MG/ML
0.2 INJECTION, SOLUTION INTRAMUSCULAR; INTRAVENOUS; SUBCUTANEOUS EVERY 5 MIN PRN
Status: DISCONTINUED | OUTPATIENT
Start: 2025-01-17 | End: 2025-01-17 | Stop reason: HOSPADM

## 2025-01-17 RX ORDER — SIMETHICONE 80 MG
80 TABLET,CHEWABLE ORAL 3 TIMES DAILY PRN
Status: DISCONTINUED | OUTPATIENT
Start: 2025-01-17 | End: 2025-01-19

## 2025-01-17 RX ORDER — KETOROLAC TROMETHAMINE 30 MG/ML
INJECTION, SOLUTION INTRAMUSCULAR; INTRAVENOUS
Status: COMPLETED
Start: 2025-01-17 | End: 2025-01-17

## 2025-01-17 RX ORDER — MORPHINE SULFATE 2 MG/ML
INJECTION, SOLUTION INTRAMUSCULAR; INTRAVENOUS AS NEEDED
Status: DISCONTINUED | OUTPATIENT
Start: 2025-01-17 | End: 2025-01-17 | Stop reason: SURG

## 2025-01-17 RX ORDER — OXYCODONE HYDROCHLORIDE 5 MG/1
5 TABLET ORAL EVERY 6 HOURS PRN
Status: DISCONTINUED | OUTPATIENT
Start: 2025-01-17 | End: 2025-01-19

## 2025-01-17 RX ORDER — NICOTINE POLACRILEX 4 MG
30 LOZENGE BUCCAL
Status: DISCONTINUED | OUTPATIENT
Start: 2025-01-17 | End: 2025-01-17 | Stop reason: HOSPADM

## 2025-01-17 RX ORDER — AMMONIA 15 % (W/V)
0.3 AMPUL (EA) INHALATION AS NEEDED
Status: DISCONTINUED | OUTPATIENT
Start: 2025-01-17 | End: 2025-01-19

## 2025-01-17 RX ORDER — BISACODYL 10 MG
10 SUPPOSITORY, RECTAL RECTAL ONCE AS NEEDED
Status: DISCONTINUED | OUTPATIENT
Start: 2025-01-17 | End: 2025-01-19

## 2025-01-17 RX ORDER — PHENYLEPHRINE HCL 10 MG/ML
VIAL (ML) INJECTION AS NEEDED
Status: DISCONTINUED | OUTPATIENT
Start: 2025-01-17 | End: 2025-01-17 | Stop reason: SURG

## 2025-01-17 RX ORDER — HYDROMORPHONE HYDROCHLORIDE 1 MG/ML
0.4 INJECTION, SOLUTION INTRAMUSCULAR; INTRAVENOUS; SUBCUTANEOUS EVERY 2 HOUR PRN
Status: ACTIVE | OUTPATIENT
Start: 2025-01-17 | End: 2025-01-18

## 2025-01-17 RX ORDER — NALBUPHINE HYDROCHLORIDE 10 MG/ML
2.5 INJECTION INTRAMUSCULAR; INTRAVENOUS; SUBCUTANEOUS EVERY 4 HOURS PRN
Status: DISCONTINUED | OUTPATIENT
Start: 2025-01-17 | End: 2025-01-19

## 2025-01-17 RX ORDER — ACETAMINOPHEN 500 MG
1000 TABLET ORAL EVERY 6 HOURS
Status: DISCONTINUED | OUTPATIENT
Start: 2025-01-17 | End: 2025-01-19

## 2025-01-17 RX ORDER — NICOTINE POLACRILEX 4 MG
15 LOZENGE BUCCAL
Status: DISCONTINUED | OUTPATIENT
Start: 2025-01-17 | End: 2025-01-17 | Stop reason: HOSPADM

## 2025-01-17 RX ORDER — ONDANSETRON 2 MG/ML
4 INJECTION INTRAMUSCULAR; INTRAVENOUS EVERY 6 HOURS PRN
Status: DISCONTINUED | OUTPATIENT
Start: 2025-01-17 | End: 2025-01-17

## 2025-01-17 RX ORDER — SODIUM CHLORIDE, SODIUM LACTATE, POTASSIUM CHLORIDE, CALCIUM CHLORIDE 600; 310; 30; 20 MG/100ML; MG/100ML; MG/100ML; MG/100ML
INJECTION, SOLUTION INTRAVENOUS CONTINUOUS
Status: DISCONTINUED | OUTPATIENT
Start: 2025-01-17 | End: 2025-01-19

## 2025-01-17 RX ORDER — DOCUSATE SODIUM 100 MG/1
100 CAPSULE, LIQUID FILLED ORAL
Status: DISCONTINUED | OUTPATIENT
Start: 2025-01-17 | End: 2025-01-19

## 2025-01-17 RX ADMIN — MORPHINE SULFATE 0.1 MG: 2 INJECTION, SOLUTION INTRAMUSCULAR; INTRAVENOUS at 07:35:00

## 2025-01-17 RX ADMIN — PHENYLEPHRINE HCL 40 MCG: 10 MG/ML VIAL (ML) INJECTION at 07:38:00

## 2025-01-17 RX ADMIN — SODIUM CHLORIDE, SODIUM LACTATE, POTASSIUM CHLORIDE, CALCIUM CHLORIDE: 600; 310; 30; 20 INJECTION, SOLUTION INTRAVENOUS at 08:17:00

## 2025-01-17 RX ADMIN — SODIUM CHLORIDE, SODIUM LACTATE, POTASSIUM CHLORIDE, CALCIUM CHLORIDE: 600; 310; 30; 20 INJECTION, SOLUTION INTRAVENOUS at 07:31:00

## 2025-01-17 RX ADMIN — PHENYLEPHRINE HCL 40 MCG: 10 MG/ML VIAL (ML) INJECTION at 08:21:00

## 2025-01-17 RX ADMIN — BUPIVACAINE HYDROCHLORIDE 1.5 ML: 7.5 INJECTION, SOLUTION INTRASPINAL at 07:35:00

## 2025-01-17 NOTE — ANESTHESIA PREPROCEDURE EVALUATION
PRE-OP EVALUATION    Patient Name: Sandra Omer    Admit Diagnosis: pregnancy  Pregnancy (HCC)    Pre-op Diagnosis: * No pre-op diagnosis entered *     SECTION WITH BILATERAL SALPINGECTOMY    Anesthesia Procedure:  SECTION WITH BILATERAL SALPINGECTOMY  BILATERAL SALPINGECTOMY (Bilateral)    Surgeons and Role:     * Luiza Marti MD - Primary    Pre-op vitals reviewed.  Temp: 97.7 °F (36.5 °C)  Pulse: 89  Resp: 18  BP: 120/76     Body mass index is 36.31 kg/m².    Current medications reviewed.  Hospital Medications:   [COMPLETED] lactated ringers IV bolus 1,000 mL  1,000 mL Intravenous Once    Followed by    lactated ringers infusion  125 mL/hr Intravenous Continuous    [COMPLETED] acetaminophen (Tylenol Extra Strength) tab 1,000 mg  1,000 mg Oral Once    ondansetron (Zofran) 4 MG/2ML injection 4 mg  4 mg Intravenous Q6H PRN    sodium citrate-citric acid (Bicitra) 500-334 MG/5ML oral solution 30 mL  30 mL Oral Once    oxyTOCIN in sodium chloride 0.9% (Pitocin) 30 Units/500mL infusion premix  62.5-900 olga-units/min Intravenous Continuous    ceFAZolin (Ancef) 2g in 10mL IV syringe premix  2 g Intravenous Once       Outpatient Medications:   Prescriptions Prior to Admission[1]    Allergies: Patient has no known allergies.      Anesthesia Evaluation    Patient summary reviewed.    Anesthetic Complications  (-) history of anesthetic complications         GI/Hepatic/Renal    Negative GI/hepatic/renal ROS.                             Cardiovascular        Exercise tolerance: good     MET: >4                                           Endo/Other    Negative endo/other ROS.  (+) diabetes and well controlled,  not using insulin                         Pulmonary    Negative pulmonary ROS.                       Neuro/Psych    Negative neuro/psych ROS.                          Repeat cx, term        Past Surgical History:   Procedure Laterality Date          Hand surgery Left      Social  History     Socioeconomic History    Marital status:    Tobacco Use    Smoking status: Never    Smokeless tobacco: Never   Vaping Use    Vaping status: Never Used   Substance and Sexual Activity    Alcohol use: Not Currently    Drug use: Never    Sexual activity: Yes   Other Topics Concern    Caffeine Concern No    Exercise No    Seat Belt Yes     History   Drug Use Unknown     Available pre-op labs reviewed.  Lab Results   Component Value Date    WBC 11.6 (H) 01/17/2025    RBC 4.62 01/17/2025    HGB 11.1 (L) 01/17/2025    HCT 35.4 01/17/2025    MCV 76.6 (L) 01/17/2025    MCH 24.0 (L) 01/17/2025    MCHC 31.4 01/17/2025    RDW 15.3 01/17/2025    .0 01/17/2025               Airway      Mallampati: II  Mouth opening: 3 FB  TM distance: > 6 cm  Neck ROM: full Cardiovascular      Rhythm: regular  Rate: normal     Dental             Pulmonary      Breath sounds clear to auscultation bilaterally.               Other findings              ASA: 2   Plan: spinal  NPO status verified and       Surgeon requests: regional block  Comment: Discussed including duramoprh  Plan/risks discussed with: patient and spouse                Present on Admission:  **None**             [1]   Medications Prior to Admission   Medication Sig Dispense Refill Last Dose/Taking    metFORMIN 500 MG Oral Tab Take 1 tablet (500 mg total) by mouth every morning AND 2 tablets (1,000 mg total) every evening. Take 1 tablet by mouth in the morning and 2 tablets by mouth in the evening. 90 tablet 1 1/16/2025 Morning    Cholecalciferol (VITAMIN D3) 1.25 MG (65307 UT) Oral Cap TAKE 1 CAPSULE BY MOUTH ONCE PER WEEK FOR 12 WEEKS.   1/16/2025 Morning    Prenatal Vit-Fe Fumarate-FA (PRENATAL VITAMINS OR) Take by mouth.   1/16/2025 Morning    OneTouch Delica Lancets 33G Does not apply Misc Check 4 times per day for gestational diabetes 100 each 3     metFORMIN 500 MG Oral Tab Take 1 tablet (500 mg total) by mouth at bedtime. 60 tablet 0     Blood  Glucose Monitoring Suppl (ONETOUCH ULTRA 2) w/Device Does not apply Kit Use as directed 4x per day 1 kit 0     Glucose Blood (ONETOUCH VERIO) In Vitro Strip Check 4 times per day for gestational diabetes 100 strip 3     cefpodoxime 100 MG Oral Tab Take 1 tablet (100 mg total) by mouth 2 (two) times daily. (Patient not taking: Reported on 1/3/2025) 10 tablet 0

## 2025-01-17 NOTE — PLAN OF CARE
Problem: Patient/Family Goals  Goal: Patient/Family Long Term Goal  Description: Patient's Long Term Goal: Uncomplicated  section    Interventions:  VS per protocol  EFM per protocol  I&O  Solomon catheter  Abd prep with clippers as needed  SCD to bilateral lower extremities  NPO  Insert/maintain IV access  Antibiotics per protocol  Informed consent       - See additional Care Plan goals for specific interventions  Outcome: Completed  Goal: Patient/Family Short Term Goal  Description: Patient's Short Term Goal: Uncomplicated  section    Interventions:  VS per protocol  I&O  EFM per protocol  Maintain IV as ordered  Antibiotics as needed per protocol  Informed consent       - See additional Care Plan goals for specific interventions  Outcome: Completed     Problem: BIRTH - VAGINAL/ SECTION  Goal: Fetal and maternal status remain reassuring during the birth process  Description: INTERVENTIONS:  - Monitor vital signs  - Monitor fetal heart rate  - Monitor uterine activity  - Monitor labor progression (vaginal delivery)  - DVT prophylaxis (C/S delivery)  - Surgical antibiotic prophylaxis (C/S delivery)  Outcome: Completed     Problem: PAIN - ADULT  Goal: Verbalizes/displays adequate comfort level or patient's stated pain goal  Description: INTERVENTIONS:  - Encourage pt to monitor pain and request assistance  - Assess pain using appropriate pain scale  - Administer analgesics based on type and severity of pain and evaluate response  - Implement non-pharmacological measures as appropriate and evaluate response  - Consider cultural and social influences on pain and pain management  - Manage/alleviate anxiety  - Utilize distraction and/or relaxation techniques  - Monitor for opioid side effects  - Notify MD/LIP if interventions unsuccessful or patient reports new pain  - Anticipate increased pain with activity and pre-medicate as appropriate  Outcome: Completed     Problem: ANXIETY  Goal: Will  report anxiety at manageable levels  Description: INTERVENTIONS:  - Administer medication as ordered  - Teach and rehearse alternative coping skills  - Provide emotional support with 1:1 interaction with staff  Outcome: Completed

## 2025-01-17 NOTE — ANESTHESIA PROCEDURE NOTES
Spinal Block    Date/Time: 1/17/2025 7:33 AM    Performed by: Shiva Donahue DO  Authorized by: Shiva Donahue DO      General Information and Staff    Start Time:  1/17/2025 7:33 AM  End Time:  1/17/2025 7:35 AM  Anesthesiologist:  Shiva Donahue DO  Performed by:  Anesthesiologist  Patient Location:  OB  Site identification: surface landmarks    Preanesthetic Checklist: patient identified, IV checked, risks and benefits discussed, monitors and equipment checked, pre-op evaluation, timeout performed, anesthesia consent and sterile technique used      Procedure Details    Patient Position:  Sitting  Prep: ChloraPrep    Monitoring:  Cardiac monitor, heart rate and continuous pulse ox  Approach:  Midline  Location:  L3-4  Injection Technique:  Single-shot    Needle    Needle Type:  Sprotte  Needle Gauge:  24 G  Needle Length:  3.5 in  Needle Insertion Depth:  3.5    Assessment    Sensory Level:  T6  Events: clear CSF, CSF aspirated, well tolerated and blood negative      Additional Comments

## 2025-01-17 NOTE — PROGRESS NOTES
I assisted Dr. Orellana in the repeat LTCS and BS for Sandra Omer on 1/17/2025.     Luiza Lambert MD  [facility]  1/17/2025 9:15 AM

## 2025-01-17 NOTE — L&D DELIVERY NOTE
Rocco Omer [[id number]]      Labor Events     labor?: No   steroids?: None  Rupture date/time: 2025     Rupture type: AROM  Fluid color: Clear  Intrapartum & labor complications: None       Labor Length    3rd stage: 0h 02m       Holland Presentation    Presentation: Vertex  Position: Occiput       Operative Delivery    Operative Vaginal Delivery: N/A                Shoulder Dystocia    Shoulder Dystocia: N/A       Anesthesia    Method: Spinal              [place]land Delivery      Head delivery date/time: 2025 08:14:22   Delivery date/time:  25 08:14:45   Delivery type: Caesarean Section    Details:  Trial of labor after : No    categorization: Repeat    priority: scheduled   Indications for : Prior Uterine Surgery   Skin incision type: 1 Pfannenstiel   Uterine Incision type: Low Transverse      Delivery location: OR       Delivery Providers    Delivering Clinician: Luiza Marti MD   Delivery personnel:  Provider Role   Linda Small, NATANAEL Baby Nurse   Louie Jiménez RN Delivery Nurse   Nely Valdez MD Neonatologist             Cord    Vessels: 3 Vessels  Complications: Nuchal  # of loops: 1  Timed cord clamping: Yes  Time in sec: 30  Cord blood disposition: cord blood bank, to lab  Gases sent?: No       Resuscitation    Method: None        Measurements      Weight: 3690 g 8 lb 2.2 oz Length: 47 cm     Head circum.: 36.5 cm Chest circum.: 35.5 cm      Abdominal circum.: 33.5 cm           Placenta    Date/time: 202517  Removal: Manual Removal  Appearance: Intact  Disposition: Pathology       Apgars    Living status: Living   Apgar Scoring Key:    0 1 2    Skin color Blue or pale Acrocyanotic Completely pink    Heart rate Absent <100 bpm >100 bpm    Reflex irritability No response Grimace Cry or active withdrawal    Muscle tone Limp Some flexion Active motion    Respiratory effort Absent Weak cry;  hypoventilation Good, crying              1 Minute:  5 Minute:  10 Minute:  15 Minute:  20 Minute:      Skin color: 0  1       Heart rate: 2  2       Reflex irritablity: 2  2       Muscle tone: 2  2       Respiratory effort: 2  2       Total: 8  9          Apgars assigned by: DR. SOLANO [facility]   disposition: with mother       Skin to Skin    Skin to skin initiated date/time: 2025 0918  Skin to skin with: Mother       Vaginal Count    No data filed       Lacerations    Episiotomy: None  Perineal lacerations: None      Vaginal laceration?: No      Cervical laceration?: No    Clitoral laceration?: No    Quantitative blood loss (mL): 150

## 2025-01-17 NOTE — DISCHARGE INSTRUCTIONS
Post  Section Home Care Instructions     We hope you were pleased with your care at [facility].  We wish you the best outcome and overall experience with the delivery of your baby.  These instructions will help to minimize pain, limit the risk for an infection, and improve the likelihood of a successful recovery.    What to Expect:  Abdominal cramping after delivery especially if you are breastfeeding.   Vaginal bleeding for about 4-6 weeks that may be followed by a yellow or white discharge for a few more weeks.  Your period will resume in approximately 6-8 weeks, unless you are breastfeeding.    If you are bottle feeding, you may notice breast engorgement in about 3 days.  Your breast may be sore and hard. Please wear a tight fitted bra or sports bra for 24-36 hours to help prevent your breast from producing milk, and use ice packs to relive any discomfort.  If you are breastfeeding, nipple dryness is very common the first few days.    Constipation is common after having a baby.  Please increase fluid and fiber in your diet.      Over-The-Counter Medication  Non-prescription anti-inflammatory medications can also help to ease the pain.  You may take Aleve, Tylenol or Ibuprofen   Colace or Metamucil for Constipation  Lanolin for dry nipples  Tucks, Witch Hazel and Epifoam for Episiotomy discomfort.   Drink a full glass of water with oral medication and take as directed.    Wound Care  The following instructions will promote proper healing and help to prevent infection  Please use soap and water over incision   Pat your incision dry and leave open to air if possible   If you have steri - strips, then please remove after 4-5 days from your surgery. You may remove after a shower to decrease discomfort.   Do not replace the Steri-Strips, if they come off.  If the tapes come off, leave them off and keep the incision clean.  You do not need to cover the incision or put any medications on the  incision.    Bathing/Showers  You may resume showers  No baths, swimming, hot tubs until your post-partum visit    Home Medication  Resume your home medications as instructed    Diet  Resume your normal diet    Activity  Refrain from vaginal intercourse, vaginal suppositories, tampon use or douches until after your post-partum visit  No exercising for 4 weeks  You may climb stairs minimally for the 1st week.    Do not do heavy housework for at least 2-3 weeks    Return to Work or School  You may return to work in 6-8 weeks  Contact your obstetrician’s office, if you need a medical release. ([phone])    Driving  Avoid driving for 1-2 weeks or sooner if not taking narcotics.    Follow-up Appointment with Your Obstetrician  Call your obstetrician’s office today for an appointment in four weeks.    The number is [phone].  Verify your appointment date, day, time, and location.  At your 1st post-partum office visit:  Your progress will be evaluated, findings reviewed, and any additional concerns and instructions will be discussed.    Questions or Concerns  Call your obstetrician’s office if you experience the following:  Severe pain not controlled by pain medication  Foul smelling vaginal discharge  Heavy bleeding  Shortness of breath  Fever  Redness, increased swelling or drainage from your incision  Crying and periods of sadness that prevents you from caring for yourself and your baby  Burning sensation during urination or inability to urinate  Swelling, redness or abnormal warmth to your leg/calf  Please call [phone]. If your call is made after office hours, a physician will be available to help you.  There is always a provider covering our patients.    Thank you for coming to [facility] to start your new family.  The nurses and the anesthesiologists try very hard to make sure you receive the best care possible.  Your trust in them as well as us is greatly appreciated.    Thanks so much,   The  [facility]

## 2025-01-17 NOTE — PROGRESS NOTES
Pt is a 39 year old female admitted to TR5/TRG5-A.     Chief Complaint   Patient presents with    Scheduled      RCS, 39w1d      Pt is  39w1d intra-uterine pregnancy.  History obtained, consents signed. Oriented to room, staff, and plan of care.     EFM tested and applied. Abdomen soft and non-tender. Active fetal movement per patient report.

## 2025-01-17 NOTE — ANESTHESIA POSTPROCEDURE EVALUATION
[facility] Patient [facility]:  Inpatient   Age/Gender 39 year old female MRN [id number]   Location [facility] LABOR & DELIVERY Attending Luiza Marti MD   Hosp Day # 0 PCP None Pcp       Anesthesia Post-op Note     SECTION WITH BILATERAL SALPINGECTOMY    Procedure Summary       Date: 25 Room / Location:  L+D OR   L+D OR    Anesthesia Start: 731 Anesthesia Stop: 913    Procedure:  SECTION WITH BILATERAL SALPINGECTOMY (Abdomen) Diagnosis: (same delivered)    Surgeons: Amarilis Orellana MD Anesthesiologist: Shiva Donahue DO    Anesthesia Type: spinal ASA Status: 2            Anesthesia Type: spinal    Vitals Value Taken Time   /56 25 09   Temp 98 °F (36.7 °C) 25 09   Pulse 88 2513   Resp 22 25   SpO2 100 % 25   Vitals shown include unfiled device data.        Patient Location: PACU    Anesthesia Type: spinal    Airway Patency: patent    Postop Pain Control: adequate    Mental Status: preanesthetic baseline    Nausea/Vomiting: none    Cardiopulmonary/Hydration status: stable euvolemic    Complications: no apparent anesthesia related complications    Postop vital signs: stable    Dental Exam: Unchanged from Preop    Patient to be discharged from PACU when criteria met.

## 2025-01-17 NOTE — PLAN OF CARE
Problem: Patient/Family Goals  Goal: Patient/Family Long Term Goal  Description: Patient's Long Term Goal: Uncomplicated  section    Interventions:  VS per protocol  EFM per protocol  I&O  Solomon catheter  Abd prep with clippers as needed  SCD to bilateral lower extremities  NPO  Insert/maintain IV access  Antibiotics per protocol  Informed consent       - See additional Care Plan goals for specific interventions  Outcome: Progressing  Goal: Patient/Family Short Term Goal  Description: Patient's Short Term Goal: Uncomplicated  section    Interventions:  VS per protocol  I&O  EFM per protocol  Maintain IV as ordered  Antibiotics as needed per protocol  Informed consent       - See additional Care Plan goals for specific interventions  Outcome: Progressing     Problem: BIRTH - VAGINAL/ SECTION  Goal: Fetal and maternal status remain reassuring during the birth process  Description: INTERVENTIONS:  - Monitor vital signs  - Monitor fetal heart rate  - Monitor uterine activity  - Monitor labor progression (vaginal delivery)  - DVT prophylaxis (C/S delivery)  - Surgical antibiotic prophylaxis (C/S delivery)  Outcome: Progressing     Problem: PAIN - ADULT  Goal: Verbalizes/displays adequate comfort level or patient's stated pain goal  Description: INTERVENTIONS:  - Encourage pt to monitor pain and request assistance  - Assess pain using appropriate pain scale  - Administer analgesics based on type and severity of pain and evaluate response  - Implement non-pharmacological measures as appropriate and evaluate response  - Consider cultural and social influences on pain and pain management  - Manage/alleviate anxiety  - Utilize distraction and/or relaxation techniques  - Monitor for opioid side effects  - Notify MD/LIP if interventions unsuccessful or patient reports new pain  - Anticipate increased pain with activity and pre-medicate as appropriate  Outcome: Progressing     Problem: ANXIETY  Goal:  Will report anxiety at manageable levels  Description: INTERVENTIONS:  - Administer medication as ordered  - Teach and rehearse alternative coping skills  - Provide emotional support with 1:1 interaction with staff  Outcome: Progressing

## 2025-01-17 NOTE — OPERATIVE REPORT
[facility]  Obstetrics and Gynecology   Section - Operative Note    Sandra Omer Patient Status:  Inpatient    1985 MRN [id number]   Location [facility] LABOR & DELIVERY Attending Luiza Marti MD   Hosp Day # 0 PCP None Pcp       Date of Procedure: 25    Preoperative Diagnosis:  1.  39 year old  at 39w1d  2. GDMA2  3. AMA  4. History of two prior c-sections  5. Desire for sterilization     Postoperative Diagnosis:  1. Same as above  2. Delivery of viable male infant    Procedure: Repeat Low Transverse  Section and Bilateral Salpingectomy    Primary surgeon: Amarilis Orellana MD     Assistant: Luiza Marti MD who was present throughout the entire case and was critical in ensuring adequate exposure for patient's safety and optimization of outcome.  The physician actively assisted in retraction, exposure, hemostasis, entry/closure as well as other essential operative technical functions.    Indications:  Patient is a 39 year old year old  at 39w1d who presented for repeat  and bilateral salpingectomy. The risks, benefits and alternatives were d/w patient including but not limited to risk of injury, infection, bleeding and subsequent  section deliveries. All questions and concerns were addressed. Patient provided verbal and written consent.     Surgical Findings: normal uterine contour, tubes and ovaries  Muscles fused  Omental adhesions to anterior abdominal wall    Baby - male, apgars 8/9, wt 3690g  Nuchal x 1    Anesthesia: Spinal    EBL: 150 cc    Procedure:     The patient was taken to the operating room where spinal anesthesia was obtained without difficulty and found to be adequate. She was then prepared and draped in the normal sterile fashion in the dorsal supine position with a leftward tilt. SCD's were placed to decrease her risk for DVT and a dose of antibiotics was given preoperatively to decrease her risk for infection.  A Time out was preformed confirming correct patient and procedure. Adequate level of anesthesia was ascertained.    A Pfannestiel skin incision was then made with the scalpel at previous incision site and carried through to the underlying fascia with a bovie. All bleeding was controlled. The fascia was incised in the midline and the incision extended laterally with Mcnally scissors. The superior aspect of the fascial incision was then grasped with Kocher clamps, elevated, and the underlying rectus muscles dissected off bluntly and with mcnally scissors. Attention was then turned to the inferior aspect of this incision which, in a similar fashion, was tented up with Kocher clamps, and the rectus muscle dissected off bluntly and with Mcnally scissors.     The rectus muscles was noted to be fused and was  with bovie electrocautery. The peritoneum was identified, and entered bluntly. Omental adhesions were noted to the peritenoneum and taken down with dima clamps and bovie electrocautery.  The peritoneal opening was then extended superiorly and inferiorly with good visualization of the bladder.   The vesicouterine peritoneum identified, grasped with the pick-ups, and entered sharply with bovie electrocatuery. This incision was then extended laterally and the bladder flap created digitally. Mukund-O retractor was then inserted and the lower uterine segment incised in a transverse fashion with the scalpel. The uterine incision was extended using cephalad/caudad traction. Artificial rupture of membranes was performed and clear fluid noted. Surgeons hand was placed through the incision and infants head elevated from the pelvis. The infant's head delivered atraumatically. Nuchal cord x 1. The nose and mouth were suctioned with a bulb suction, and the cord clamped and cut after 30 second delay. The infant was placed in the warmer to be evaluated by Neonatology who was present for delivery. Cord blood was obtained.     The  placenta was then expressed and sent to pathology.  The uterus was cleared of all clots and debris. The hysterotomy was repaired with 0-Vicryl in a running, locked fashion.A figure of 8 was placed on the left side of the incision. Excellent hemostasis was noted. The uterus was returned to the abdomen. Two babcocks were used to elevated left fallopian tube and ligasure electrocautery was used to cauterize and cut starting at the fimbriated end and followed along to the cornual end. Similar process was done on the right fallopian tube. Both tubes were sent to pathology. The gutters were cleared of all clots and the peritoneum was manually reapproximated. Re-inspection of the hysterotomy, peritomeum and rectus muscles was noted to be entirely hemostatic. The fascia was reapproximated with 0-Vicryl in a running fashion. The subcutaneous tissue was irrigated and all bleeding was controlled. The subcutaneous space was closed with 2-0 Plain Gut.  The skin was closed with 4-0 Monocryl on a Jovany needle and steri strips.     The patient tolerated the procedure well. Sponge, lap and needle counts were correct times three. The patient was taken to the recovery room in good condition.    Specimen:  Placenta, fallopian tubes    Drains: wolfe to dependant drainage    Condition:   stable    Complications: None; patient tolerated the procedure well.    Amarilis Orellana MD   [facility] - OBGYN        Note to patient and family   The 21st Century Cures Act makes medical notes available to patients in the interest of transparency.  However, please be advised that this is a medical document.  It is intended as wmpy-rw-jwys communication.  It is written and medical language may contain abbreviations or verbiage that are technical and unfamiliar.  It may appear blunt or direct.  Medical documents are intended to carry relevant information, facts as evident, and the clinical opinion of the practitioner.

## 2025-01-17 NOTE — PAYOR COMM NOTE
ADMISSION REVIEW     Payor: [employer] [facility]/HMO/POS/EPO  Subscriber #:  [id number]  Authorization Number: [id number]    Admit date: 1/17/25  Admit time:  5:18 AM       REVIEW DOCUMENTATION:  ED Provider Notes    No notes of this type exist for this encounter.         MEDICATIONS ADMINISTERED IN LAST 1 DAY:  acetaminophen (Tylenol Extra Strength) tab 1,000 mg       Date Action Dose Route User    1/17/2025 0604 Given 1,000 mg Oral Magi Larkin RN          acetaminophen (Tylenol Extra Strength) tab 1,000 mg       Date Action Dose Route User    1/17/2025 1423 Given 1,000 mg Oral Faith Hernandez RN          atropine 0.1 MG/ML injection       Date Action Dose Route User    1/17/2025 0739 Given 0.2 mg Intravenous Shiva Donahue DO          bupivacaine in dextrose (Marcaine) 0.75-8.25 % intrathecal/spinal injection       Date Action Dose Route User    1/17/2025 0735 Given 1.5 mL Intrathecal Shiva Donahue DO          ceFAZolin (Ancef) 2g in 10mL IV syringe premix       Date Action Dose Route User    1/17/2025 0740 Given 2 g Intravenous Shiva Donahue DO          fentaNYL (Sublimaze) 50 mcg/mL injection       Date Action Dose Route User    1/17/2025 0735 Given 10 mcg Intrathecal Shiva Donahue DO          ketorolac (Toradol) 30 MG/ML injection 30 mg       Date Action Dose Route User    1/17/2025 0940 Given 30 mg Intravenous Louie Jiménez RN          ketorolac (Toradol) 30 MG/ML injection       Date Action Dose Route User    1/17/2025 0940 Given 30 mg Intravenous Louie Jiménez RN          lactated ringers IV bolus 1,000 mL       Date Action Dose Route User    1/17/2025 0557 New Bag 1,000 mL Intravenous Magi Larkin RN          lactated ringers infusion       Date Action Dose Route User    1/17/2025 0817 New Bag (none) Intravenous Shiva Donahue DO    1/17/2025 0731 New Bag (none) Intravenous Shiva Donahue, DO          morphINE PF 2 MG/ML injection       Date Action Dose Route  User    1/17/2025 0735 Given 0.1 mg Intrathecal Shiva Donahue DO          oxyTOCIN in sodium chloride 0.9% (Pitocin) 30 Units/500mL infusion premix       Date Action Dose Route User    1/17/2025 0917 New Bag 62.5 olga-units/min Intravenous Louie Jiménez RN          oxyTOCIN in sodium chloride 0.9% (Pitocin) 30 Units/500mL infusion premix       Date Action Dose Route User    1/17/2025 0817 Given 500 mL Intravenous Shiva Donahue DO          oxyTOCIN in sodium chloride 0.9% (Pitocin) 30 Units/500mL infusion premix       Date Action Dose Route User    1/17/2025 0917 New Bag 62.5 olga-units/min Intravenous Louie Jiménez RN          oxyTOCIN in sodium chloride 0.9% (Pitocin) 30 Units/500mL infusion premix       Date Action Dose Route User    1/17/2025 1040 New Bag 62.5 olga-units/min Intravenous Louie Jiménez RN          oxyTOCIN in sodium chloride 0.9% (Pitocin) 30 Units/500mL infusion premix       Date Action Dose Route User    1/17/2025 1040 New Bag 62.5 olga-units/min Intravenous Louie Jiménez RN          phenylephrine (Sg-Synephrine) 10 MG/ML injection       Date Action Dose Route User    1/17/2025 0821 Given 40 mcg Intravenous Shiva Donahue DO    1/17/2025 0738 Given 40 mcg Intravenous Shiva Donahue DO          sodium citrate-citric acid (Bicitra) 500-334 MG/5ML oral solution 30 mL       Date Action Dose Route User    1/17/2025 0711 Given 30 mL Oral Louie Jiménez RN            Vitals (last day)       Date/Time Temp Pulse Resp BP SpO2 Weight O2 Device O2 Flow Rate (L/min) Who    01/17/25 1335 -- 81 -- 104/72 96 % -- -- -- EM    01/17/25 1201 97.8 °F (36.6 °C) 85 17 97/68 -- -- -- -- GG    01/17/25 1030 -- 76 17 101/71 97 % -- None (Room air) -- SB    01/17/25 1015 -- 89 14 108/72 98 % -- None (Room air) -- SB    01/17/25 1000 -- 92 24 109/65 99 % -- None (Room air) -- SB    01/17/25 0945 -- 92 30 105/65 100 % -- None (Room air) -- SB    01/17/25 0930 -- 91 24 108/36 98 % -- None  (Room air) -- SB    25 0918 -- 83 24 110/71 99 % -- -- -- SB    25 0913 -- -- -- -- -- -- None (Room air) -- SB    25 0913 98 °F (36.7 °C) 80 28 113/56 99 % -- -- -- GB    25 0644 -- -- -- -- -- 205 lb (93 kg) -- -- SA    25 0549 97.7 °F (36.5 °C) 89 18 120/76 -- -- None (Room air) -- SA          2025 H&P  [facility]  Obstetrics and Gynecology  History & Physical           Sandra Omer Patient Status:  Surgery Admit - Inpt    1985 MRN [id number]   Location [facility] LABOR & DELIVERY Attending Luiza Marti MD   Hospital Day 0 PCP None Pcp      CC: Patient is here for  section      SUBJECTIVE:     Sandra Omer is a 39 year old  female at 39w0d Estimated Date of Delivery: 25 who is being admitted for repeat  section and bilateral salpingectomy. Her current obstetrical history is significant for GDMA2 on Metformin, hx of two prior c-sections, desire for sterilization, AMA. Denies regular contractions, LOF, bleeding. + FM. Denies vision changes, SOB, chest pain, RUQ pain, or headaches     Discussed risks/benefits of  section including risk of bleeding and infection, need for medications, surgical interventions including hysterectomy and blood transfusion to control bleeding. Discussed with the patient was that risk of transfusion include counseled to the risks of bleeding from the procedure including subsequent risk of yaw diseases from the transfusion including HIV (1:2,000,000) and hepatitis (1:30,000). Discussed risk of injury to surrounding organs including bowel and bladder.       WILFREDO Confirmation  LMP: Patient's last menstrual period was 2024 (exact date).  WILFREDO: 2025, by Last Menstrual Period      Current pregnancy complications:       Patient Active Problem List     Diagnosis    Pregnancy (HCC)    Gestational diabetes mellitus (GDM) controlled on oral hypoglycemic drug,  antepartum (HCC)       10/20/2024- elevated 2/4- referred to DM Education  Saw DM education 2024 - will need to initiate insulin therapy - new Mfm referral sent along with message to RD.   24 Saw MFM today; did not start insulin; recommended continued diet control; per pt blood sugars have been normal EFW 1884 g ( 4 lb 2 oz); 37% at 32 weeks  - reviewed logs and fasting still elev but better pp control; will start Metformin 500 mg daily; will need weekly NST; and twice weekly NSTs at 34 weeks.   2024: fasting still elevated, postprandials within target parameters -> Will increase Metformin to 500mg BID; continue weekly NST and twice weekly NST at 34 weeks  24: Increase metformin to 500 mg QAM and 1000 mg QPM  24: Increase Metformin to 1000 mg QAM and continue 1000 mg QPM  [x] MFM consult for insulin reviewed and d/w patient - continued on Metformin  Continue four times daily capillary blood glucose assessments (fasting and 2 hour postprandial)  Weekly Maternal-Fetal Medicine review of capillary blood glucose values  Weekly NSTs at 32 weeks; twice weekly NST's at 34 weeks  Delivery at 38-39  weeks advised for medication controlled diabetes       Urinary tract infection in mother during pregnancy, antepartum ([facility])       Klebsiella --> oral antibiotics   [x] repeat urine culture was normal       Supervision of other normal pregnancy, antepartum ([facility])    Obesity complicating pregnancy ([facility])       [x] early 1 hr GTT- elevated but normal early 3 hour GTT  Continue four times daily capillary blood glucose assessments (fasting and 2 hour postprandial)  Weekly Maternal-Fetal Medicine review of capillary blood glucose values  Weekly NSTs at 32 weeks; twice weekly NST's at 34 weeks  Delivery at 38-39  weeks advised for medication controlled diabetes       History of        X2  [ ] repeat  scheduled 2025 at 07:30 am   Patient desires permanent sterilization with  bilateral salpingectomy at time of C/S        Advanced maternal age in multigravida (HCC)       Low dose ASA  [ x] MFM consult  [X] Follow-up growth & BPP ultrasound at 32 weeks gestation  [X ] NIPT wnl            Obstetric History:                    OB History    Para Term  AB Living   3 2 2 0 0 3   SAB IAB Ectopic Multiple Live Births      0 0 0 1 3          # Outcome Date GA Lbr Nixon/2nd Weight Sex Type Anes PTL Lv   3 Current                     2 Term   41w0d     M Caesarean     ORLY   1A Term   38w0d     M Caesarean     ORLY   1B Term   38w0d     F Caesarean     ORLY      Past Medical History:   Past Medical History       Past Medical History:    Gestational diabetes (HCC)    Supervision of other normal pregnancy, antepartum ([facility])         Past Social History:   Past Surgical History         Past Surgical History:   Procedure Laterality Date            Hand surgery Left           Family History:   Family History         Family History   Problem Relation Age of Onset    No Known Problems Mother      No Known Problems Father           Social History:   Social History           Tobacco Use    Smoking status: Never    Smokeless tobacco: Never   Substance Use Topics    Alcohol use: Not Currently         Home Meds: [Prescriptions Prior to Admission]    [Prescriptions Prior to Admission]          Medications Prior to Admission   Medication Sig Dispense Refill Last Dose/Taking    metFORMIN 500 MG Oral Tab Take 1 tablet (500 mg total) by mouth every morning AND 2 tablets (1,000 mg total) every evening. Take 1 tablet by mouth in the morning and 2 tablets by mouth in the evening. 90 tablet 1 2025 Morning    Cholecalciferol (VITAMIN D3) 1.25 MG (46343 UT) Oral Cap TAKE 1 CAPSULE BY MOUTH ONCE PER WEEK FOR 12 WEEKS.     2025 Morning    Prenatal Vit-Fe Fumarate-FA (PRENATAL VITAMINS OR) Take by mouth.     2025 Morning    OneTouch Delica Lancets 33G Does not apply Misc Check 4 times per day  for gestational diabetes 100 each 3      metFORMIN 500 MG Oral Tab Take 1 tablet (500 mg total) by mouth at bedtime. 60 tablet 0      Blood Glucose Monitoring Suppl (ONETOUCH ULTRA 2) w/Device Does not apply Kit Use as directed 4x per day 1 kit 0      Glucose Blood (ONETOUCH VERIO) In Vitro Strip Check 4 times per day for gestational diabetes 100 strip 3      cefpodoxime 100 MG Oral Tab Take 1 tablet (100 mg total) by mouth 2 (two) times daily. (Patient not taking: Reported on 1/3/2025) 10 tablet 0       Allergies: [Allergies]    [Allergies]  No Known Allergies     OBJECTIVE:     Temp:  [97.7 °F (36.5 °C)] 97.7 °F (36.5 °C)  Pulse:  [89] 89  Resp:  [18] 18  BP: (120)/(76) 120/76  Body mass index is 36.31 kg/m².     General: AAO. NAD.   Lungs: no tachypnea, retractions or cyanosis  CV:  normal peripheral perfusion   Abdomen: FHT present, gravid   Extremities: negative edema bilaterally  FHT: moderate variability/120 BPM / Positive accelerations/Negative decelerations              TOCO: none     SVE: deferred     Estimated Fetal Weight: 3,310 g 70%ile 1/3/2025  Presentation: Cephalic by Leopold's  Placenta location: Posterior        Prenatal Labs Brief Review   Blood Type:         Lab Results   Component Value Date     ABO O 2025     RH Positive 2025            Inpatient labs:        Lab Results   Component Value Date     WBC 11.6 2025     HGB 11.1 2025     HCT 35.4 2025     .0 2025         ASSESSMENT/ PLAN:     Sandra Omer is a 39 year old  female at 39w0d Estimated Date of Delivery: 25 who is being admitted for repeat  section and bilateral salpingectomy     Preoperative:   - Preoperative antibiotics: 2g Cefoxitin   - LABS: CBC: Pre-Op Hgb 11.1, Type and Screen  - IVF: 1 L LR Bolus then at 125 cc/hr  - DIET: NPO   - Hx of  x 2  Fetal monitoring: CEFM  GBS: NEG  Pain: Anesthesia to evaluate     Desires sterilization  - Private  insurance  - Is 100% sure she does not want any future pregnancies; knows this is irreversible; discussed salpingectomy for decreasing ovarian cancer risk     Comorbidities  - GDMA2: Metformin 1000 mg BID; EFW 3,310 g 70%ile 1/3/25; BS this AM 97  - AMA cfDNA low risk     Risks, benefits, alternatives and possible complications have been discussed in detail with the patient including but not limited to bleeding, infection, injury to local organs (bowel/ bladder). Pt desires to proceed w/ surgery as scheduled. Pre-admission, admission, and post admission procedures and expectations were discussed in detail.  All questions answered, all appropriate consents will be signed at the Hospital. Patient verbalized understanding and agreement. Admission is planned for today. Delivery via CS anticipated.     Amarilis Orellana MD   [facility] - OBGYN         2025 OB.GYN   Amarilis Orellana MD   Physician  OB/Gyn     L&D Delivery Note     Signed     Date of Service: 2025  9:12 AM     Signed         Rocco Omer [[id number]]       Labor Events     labor?: No   steroids?: None  Rupture date/time: 2025 0813     Rupture type: AROM  Fluid color: Clear  Intrapartum & labor complications: None         Labor Length    3rd stage: 0h 02m         [place] Presentation    Presentation: Vertex  Position: Occiput         Operative Delivery    Operative Vaginal Delivery: N/A                          Shoulder Dystocia    Shoulder Dystocia: N/A         Anesthesia    Method: Spinal                     Delivery            Head delivery date/time: 2025 08:14:22   Delivery date/time:  25 08:14:45   Delivery type: Caesarean Section     Details:  Trial of labor after : No    categorization: Repeat    priority: scheduled   Indications for : Prior Uterine Surgery   Skin incision type: 1 Pfannenstiel   Uterine Incision type: Low Transverse      Delivery location: OR          Delivery Providers    Delivering Clinician: Luiza Marti MD    Delivery personnel:  Provider Role   Linda Small, NATANAEL Baby Nurse   Louie Jiménez, NATANAEL Delivery Nurse   Nely Valdez MD Neonatologist                Cord    Vessels: 3 Vessels  Complications: Nuchal  # of loops: 1  Timed cord clamping: Yes  Time in sec: 30  Cord blood disposition: cord blood bank, to lab  Gases sent?: No         Resuscitation    Method: None         [place] Measurements       Weight: 3690 g 8 lb 2.2 oz Length: 47 cm      Head circum.: 36.5 cm Chest circum.: 35.5 cm       Abdominal circum.: 33.5 cm               Placenta    Date/time: 2025 0817  Removal: Manual Removal  Appearance: Intact  Disposition: Pathology         Apgars    Living status: Living    Apgar Scoring Key:    0 1 2     Skin color Blue or pale Acrocyanotic Completely pink     Heart rate Absent <100 bpm >100 bpm     Reflex irritability No response Grimace Cry or active withdrawal     Muscle tone Limp Some flexion Active motion     Respiratory effort Absent Weak cry; hypoventilation Good, crying                1 Minute:  5 Minute:  10 Minute:  15 Minute:  20 Minute:       Skin color: 0  1          Heart rate: 2  2          Reflex irritablity: 2  2          Muscle tone: 2  2          Respiratory effort: 2  2          Total: 8  9             Apgars assigned by: DR. SOLANO NICU  [place] disposition: with mother         Skin to Skin    Skin to skin initiated date/time: 2025 0918  Skin to skin with: Mother         Vaginal Count    No data filed         Lacerations    Episiotomy: None  Perineal lacerations: None        Vaginal laceration?: No        Cervical laceration?: No     Clitoral laceration?: No     Quantitative blood loss (mL): 150                        2025 Operative Report   Amarilis Orellana MD   Physician  OB/Gyn     Operative Report     Signed     Date of Service: 2025  7:53 AM  Case Time: Procedures: Surgeons:   2025  7:53 AM   SECTION WITH BILATERAL SALPINGECTOMY    Amarilis Orellana MD Delapp, Kimberly, MD               Signed         [facility]  Obstetrics and Gynecology   Section - Operative Note           Sandra Omer Patient Status:  Inpatient    1985 MRN [id number]   Location [facility] LABOR & DELIVERY Attending Luiza Marti MD   Hosp Day # 0 PCP None Pcp         Date of Procedure: 25     Preoperative Diagnosis:  1.  39 year old  at 39w1d  2. GDMA2  3. AMA  4. History of two prior c-sections  5. Desire for sterilization     Postoperative Diagnosis:  1. Same as above  2. Delivery of viable male infant     Procedure: Repeat Low Transverse  Section and Bilateral Salpingectomy     Primary surgeon: Amarilis Orellana MD      Assistant: Luiza Marti MD who was present throughout the entire case and was critical in ensuring adequate exposure for patient's safety and optimization of outcome.  The physician actively assisted in retraction, exposure, hemostasis, entry/closure as well as other essential operative technical functions.     Indications:  Patient is a 39 year old year old  at 39w1d who presented for repeat  and bilateral salpingectomy. The risks, benefits and alternatives were d/w patient including but not limited to risk of injury, infection, bleeding and subsequent  section deliveries. All questions and concerns were addressed. Patient provided verbal and written consent.      Surgical Findings: normal uterine contour, tubes and ovaries  Muscles fused  Omental adhesions to anterior abdominal wall    Baby - male, apgars 8/9, wt 3690g  Nuchal x 1     Anesthesia: Spinal     EBL: 150 cc

## 2025-01-18 PROBLEM — Z98.891 STATUS POST REPEAT LOW TRANSVERSE CESAREAN SECTION: Status: ACTIVE | Noted: 2025-01-18

## 2025-01-18 PROBLEM — Z90.79 STATUS POST BILATERAL SALPINGECTOMY: Status: ACTIVE | Noted: 2025-01-18

## 2025-01-18 LAB
BASOPHILS # BLD AUTO: 0.04 X10(3) UL (ref 0–0.2)
BASOPHILS NFR BLD AUTO: 0.3 %
EOSINOPHIL # BLD AUTO: 0.03 X10(3) UL (ref 0–0.7)
EOSINOPHIL NFR BLD AUTO: 0.2 %
ERYTHROCYTE [DISTWIDTH] IN BLOOD BY AUTOMATED COUNT: 15.6 %
HCT VFR BLD AUTO: 32.7 %
HGB BLD-MCNC: 10.3 G/DL
IMM GRANULOCYTES # BLD AUTO: 0.09 X10(3) UL (ref 0–1)
IMM GRANULOCYTES NFR BLD: 0.6 %
LYMPHOCYTES # BLD AUTO: 2.04 X10(3) UL (ref 1–4)
LYMPHOCYTES NFR BLD AUTO: 14.3 %
MCH RBC QN AUTO: 24.5 PG (ref 26–34)
MCHC RBC AUTO-ENTMCNC: 31.5 G/DL (ref 31–37)
MCV RBC AUTO: 77.7 FL
MONOCYTES # BLD AUTO: 1.07 X10(3) UL (ref 0.1–1)
MONOCYTES NFR BLD AUTO: 7.5 %
NEUTROPHILS # BLD AUTO: 11 X10 (3) UL (ref 1.5–7.7)
NEUTROPHILS # BLD AUTO: 11 X10(3) UL (ref 1.5–7.7)
NEUTROPHILS NFR BLD AUTO: 77.1 %
PLATELET # BLD AUTO: 212 10(3)UL (ref 150–450)
RBC # BLD AUTO: 4.21 X10(6)UL
WBC # BLD AUTO: 14.3 X10(3) UL (ref 4–11)

## 2025-01-18 RX ORDER — IBUPROFEN 600 MG/1
600 TABLET, FILM COATED ORAL EVERY 6 HOURS
Qty: 60 TABLET | Refills: 0 | Status: SHIPPED | OUTPATIENT
Start: 2025-01-18

## 2025-01-18 RX ORDER — PSEUDOEPHEDRINE HCL 30 MG
100 TABLET ORAL 2 TIMES DAILY PRN
Qty: 30 CAPSULE | Refills: 0 | Status: SHIPPED | OUTPATIENT
Start: 2025-01-18

## 2025-01-18 RX ORDER — ACETAMINOPHEN 500 MG
500 TABLET ORAL EVERY 6 HOURS PRN
Qty: 60 TABLET | Refills: 0 | Status: SHIPPED | OUTPATIENT
Start: 2025-01-18

## 2025-01-18 RX ORDER — CHOLECALCIFEROL (VITAMIN D3) 25 MCG
1 TABLET,CHEWABLE ORAL DAILY
Status: DISCONTINUED | OUTPATIENT
Start: 2025-01-18 | End: 2025-01-19

## 2025-01-18 NOTE — PROGRESS NOTES
[facility]  Post-Partum Caesarean Section Progress Note    Sandra Omer Patient Status:  Inpatient    1985 MRN [id number]   Location [facility] 2SW-J Attending Luiza Marti MD   Hosp Day # 1 PCP None Pcp     SUBJECTIVE:    Postpartum Day 1 s/p repeat LTCS and BS    The patient feels well.  Pain is well controlled with current medications. Lochia is minimal.     The patient is tolerating a diet, ambulating without difficulty, voiding spontaneously. Flatus has been passed.    The patient is breast and bottle feeding. Denies breast complaints, but concerned about milk production.    OBJECTIVE:    Vital signs in last 24 hours:  Temp:  [98 °F (36.7 °C)-98.1 °F (36.7 °C)] 98.1 °F (36.7 °C)  Pulse:  [] 86  Resp:  [12-17] 12  BP: ()/(57-77) 106/67  SpO2:  [96 %-99 %] 98 %    Input/Output:    Intake/Output Summary (Last 24 hours) at 2025 1218  Last data filed at 2025 0211  Gross per 24 hour   Intake --   Output 1900 ml   Net -1900 ml       General:    alert, appears stated age, cooperative, and no distress   Abdomen:  soft, appropriately tender, nondistended, no rebound or guarding, bowel sounds active    Uterine Fundus:   firm at U-1   Incision:  no significant drainage, no dehiscence, no significant erythema, well approximated with steristrips     Ext:  BLE nontender, no edema     Data Reviewed:  Recent Labs   Lab 25  0525 25  0757   HGB 11.1* 10.3*   HCT 35.4 32.7*   WBC 11.6* 14.3*   .0 212.0     Rubella IgG   Date Value Ref Range Status   2024 Positive Positive Final     Comment:     Rubella IgG Result Interpretation    Negative   <5.0 IU/mL  Equivocal  >= 5.0 - 9.9 IU/mL  Positive:  >= 10.0 IU/mL             ASSESSMENT/PLAN:  39 year old  POD#1 s/p LTCS and BS at 39w1d, doing well. Baby \"Aguila\" doing well, per patient.    - routine postpartum and post-op care  - acute blood loss anemia - VSS and asymptomatic, H&H decrease  appropriate, continue PNV  - adequate UOP, wolfe catheter discontinued   - Rh pos - Rhogam not indicated  - Rubella immune  - regular diet, ambulate  - breastfeeding: lactation nursing available as needed  - desires circumcision for baby boy: Mother/parents counseled this morning concerning the technique, risks, and limited indications for  circumcision. We reviewed risks, including bleeding, infection, damage to penis itself, and possible need for revision in the future. Reviewed proper hygiene following procedure. The mother/parents voiced understanding, questions were answered satisfactorily, and she/they desired to proceed with the procedure.       Dispo: Anticipate d/c home POD#2-3 if remains clinically stable    Luiza Lambert MD  [facility] OB[facility]  2025 12:18 PM

## 2025-01-18 NOTE — PROGRESS NOTES
[facility] 2SW-J n    Sandra Omer Patient Status:  Inpatient   Age/Gender 39 year old female MRN [id number]   Location [facility] 2SW-J Attending Luiza Marti MD   Hosp Day # 1 PCP None Pcp      Anesthesia Pain Progress Note    Anesthesia Technique:   Spinal Anesthesia     Pain Management Technique:  In addition to available oral supplemental and IV medications  Patient received neuraxial preservative free morphine for post procedural pain control.    Post Procedure Pain Quality:    Adequate    Pain Management Side Effects:  None     /67 (BP Location: Right arm)   Pulse 86   Temp 98.1 °F (36.7 °C) (Oral)   Resp 12   Wt 93 kg (205 lb)   LMP 2024 (Exact Date)   SpO2 98%   Breastfeeding Unknown   BMI 36.31 kg/m²       Injection Site: Injection site is intact on inspection     Complications from Pain Management or Anesthesia:   None    All patient questions were answered.  Follow up pain management is separate from intraoperative anesthetic needs.  Pain care is transitioned to primary service, with management by oral medications.    Thank you for asking us to participate in the care of your patient.    Kp Bowden MD, 25, 8:35 AM      Kp Bowden MD, 25, 8:35 AM

## 2025-01-19 VITALS
RESPIRATION RATE: 16 BRPM | SYSTOLIC BLOOD PRESSURE: 131 MMHG | DIASTOLIC BLOOD PRESSURE: 86 MMHG | BODY MASS INDEX: 36 KG/M2 | TEMPERATURE: 98 F | WEIGHT: 205 LBS | OXYGEN SATURATION: 98 % | HEART RATE: 76 BPM

## 2025-01-19 NOTE — DISCHARGE SUMMARY
[facility]  Discharge Summary    Sandra Omer Patient Status:  inpatient    1985 MRN [id number]   Location -A Attending [facility]   Hosp Day # 2 PCP None Pcp     Date of Admission: 2025    Date of Discharge: 2025     Admitting Diagnosis:   [place] IUP 39w1d   Present on Admission:   Pregnancy ([facility])   History of  delivery   Request for sterilization       Discharge Diagnosis:   S/p LTCS of liveborn infant at 39w1d  S/p bilateral salpingectomy    Patient Active Problem List   Diagnosis    History of  delivery    Advanced maternal age in multigravida ([facility])    Supervision of other normal pregnancy, antepartum ([facility])    Obesity complicating pregnancy ([facility])    Urinary tract infection in mother during pregnancy, antepartum ([facility])    Gestational diabetes mellitus (GDM) controlled on oral hypoglycemic drug, antepartum ([facility])    Pregnancy ([facility])    Request for sterilization    Status post repeat low transverse  section    Status post bilateral salpingectomy          Hospital Course: Patient admitted for repeat  delivery and permanent sterilization. Please refer to operative note for further details, . Healthy male infant, APGARs 8/9. Post-operatively she did well, meeting all milestones: her pain is well controlled, she is ambulating and voiding without difficulty, she is tolerating a general diet. She was discharged to home POD#2 in stable condition.    Consultations: None    Procedures: LTCS and BS    Complications: None    Discharge Condition: Stable    Discharge Medications: Current Discharge Medication List       Medication List        START taking these medications      acetaminophen 500 MG Tabs  Commonly known as: Tylenol Extra Strength  Take 1 tablet (500 mg total) by mouth every 6 (six) hours as needed for Pain.     docusate sodium 100 MG Caps  Commonly known as: COLACE  Take 100 mg by mouth 2 (two) times daily as needed for  constipation.     ibuprofen 600 MG Tabs  Commonly known as: Motrin  Take 1 tablet (600 mg total) by mouth every 6 (six) hours.            CONTINUE taking these medications      OneTouch Delica Lancets 33G Misc  Check 4 times per day for gestational diabetes     OneTouch Ultra 2 w/Device Kit  Use as directed 4x per day     PRENATAL VITAMINS OR     Vitamin D3 1.25 MG (70247 UT) Caps            STOP taking these medications      cefpodoxime 100 MG Tabs  Commonly known as: Vantin     metFORMIN 500 MG Tabs  Commonly known as: Glucophage     OneTouch Verio Strp               Where to Get Your Medications        These medications were sent to [facility]/pharmacy #[id number] - [place] [id number] [facility] AT [facility], [phone], [phone]  [address] [facility] [address]      Phone: [phone]   acetaminophen 500 MG Tabs  docusate sodium 100 MG Caps  ibuprofen 600 MG Tabs         Follow up Visits: Follow-up with [facility] within 2 weeks.  Return precautions reviewed.  Postpartum instructions were given including rest, no heavy lifting or exercise, pelvic rest, and analgesia with ibuprofen and/or tylenol.  Pt asked to call with any signs of complications including but not limited to fever, excessive pain, heavy bleeding, etc.    Luiza Lambert MD  [facility]  1/19/2025 10:27 AM

## 2025-01-19 NOTE — PROGRESS NOTES
[facility]  Post-Partum Caesarean Section Progress Note    Sandra Omer Patient Status:  Inpatient    1985 MRN [id number]   Location [facility] 2SW-J Attending Luiza Marti MD   Hosp Day # 2 PCP None Pcp     SUBJECTIVE:    Postpartum Day 2 s/p repeat LTCS and BS    The patient feels well.  Pain is well controlled with current medications. Lochia is minimal.     The patient is tolerating a diet, ambulating without difficulty, voiding spontaneously. Flatus has been passed.    The patient is breast and bottle feeding. Denies breast complaints, but concerned about milk production.    OBJECTIVE:    Vital signs in last 24 hours:  Temp:  [97.8 °F (36.6 °C)-98.4 °F (36.9 °C)] 97.8 °F (36.6 °C)  Pulse:  [76-91] 76  Resp:  [16] 16  BP: (111-131)/(73-86) 131/86    Input/Output:  No intake or output data in the 24 hours ending 25 0944      General:    alert, appears stated age, cooperative, and no distress   Abdomen:  soft, appropriately tender, nondistended, no rebound or guarding, bowel sounds active    Uterine Fundus:   firm at U-1   Incision:  no significant drainage, no dehiscence, no significant erythema, well approximated with steristrips     Ext:  BLE nontender, no edema     Data Reviewed:  Recent Labs   Lab 25  0525 25  0757   HGB 11.1* 10.3*   HCT 35.4 32.7*   WBC 11.6* 14.3*   .0 212.0     Rubella IgG   Date Value Ref Range Status   2024 Positive Positive Final     Comment:     Rubella IgG Result Interpretation    Negative   <5.0 IU/mL  Equivocal  >= 5.0 - 9.9 IU/mL  Positive:  >= 10.0 IU/mL             ASSESSMENT/PLAN:  39 year old  POD#2 s/p LTCS and BS at 39w1d, doing well. Baby \"Aguila\" doing well, per patient.    - routine postpartum and post-op care  - acute blood loss anemia - VSS and asymptomatic, H&H decrease appropriate, continue PNV  - Rh pos - Rhogam not indicated  - Rubella immune  - regular diet, ambulate  - breastfeeding: lactation  nursing available as needed  - s/p circumcision for baby boy       Dispo: discharge home today    Luiza Lambert MD  [facility]  1/19/2025 9:45 AM

## 2025-01-21 ENCOUNTER — TELEPHONE (OUTPATIENT)
Dept: OBGYN UNIT | Facility: HOSPITAL | Age: 40
End: 2025-01-21

## 2025-01-30 ENCOUNTER — POSTPARTUM (OUTPATIENT)
Dept: OBGYN CLINIC | Facility: CLINIC | Age: 40
End: 2025-01-30
Payer: COMMERCIAL

## 2025-01-30 VITALS
WEIGHT: 185 LBS | SYSTOLIC BLOOD PRESSURE: 102 MMHG | BODY MASS INDEX: 32.78 KG/M2 | DIASTOLIC BLOOD PRESSURE: 72 MMHG | HEART RATE: 100 BPM | HEIGHT: 63 IN

## 2025-01-30 DIAGNOSIS — Z98.891 STATUS POST REPEAT LOW TRANSVERSE CESAREAN SECTION: ICD-10-CM

## 2025-01-30 DIAGNOSIS — Z90.79 STATUS POST BILATERAL SALPINGECTOMY: Primary | ICD-10-CM

## 2025-01-30 DIAGNOSIS — Z86.32 HISTORY OF GESTATIONAL DIABETES MELLITUS (GDM): ICD-10-CM

## 2025-01-30 PROBLEM — Z34.80 SUPERVISION OF OTHER NORMAL PREGNANCY, ANTEPARTUM (HCC): Status: RESOLVED | Noted: 2024-07-17 | Resolved: 2025-01-30

## 2025-01-30 PROBLEM — Z30.2 REQUEST FOR STERILIZATION: Status: RESOLVED | Noted: 2025-01-17 | Resolved: 2025-01-30

## 2025-01-30 PROBLEM — O23.40 URINARY TRACT INFECTION IN MOTHER DURING PREGNANCY, ANTEPARTUM (HCC): Status: RESOLVED | Noted: 2024-07-19 | Resolved: 2025-01-30

## 2025-01-30 PROBLEM — O09.529 ADVANCED MATERNAL AGE IN MULTIGRAVIDA (HCC): Status: RESOLVED | Noted: 2024-06-19 | Resolved: 2025-01-30

## 2025-01-30 PROBLEM — O99.210 OBESITY COMPLICATING PREGNANCY (HCC): Status: RESOLVED | Noted: 2024-07-17 | Resolved: 2025-01-30

## 2025-01-30 PROBLEM — Z34.90 PREGNANCY (HCC): Status: RESOLVED | Noted: 2025-01-17 | Resolved: 2025-01-30

## 2025-01-30 PROCEDURE — 99024 POSTOP FOLLOW-UP VISIT: CPT | Performed by: STUDENT IN AN ORGANIZED HEALTH CARE EDUCATION/TRAINING PROGRAM

## 2025-01-30 NOTE — PROGRESS NOTES
[facility]  Obstetrics and Gynecology   Postpartum Progress Note    Subjective:     Sandra Omer is a 39 year old  female who is s/p RLTCS and BS on 2025. Her pregnancy was complicated by GDMA2, AMA, h/o CS x2, and desire for permanent sterilization. She reports doing well. Baby boy doing well and breast feeding. The patient reports vagina bleeding has stopped. Has not had menses yet. The patient denies emotional concerns. States nipple sensitivity due to excessive and aggressive baby feeds. Advised Lanolin topical cream.    Review of Systems:  General:  denies fevers, chills, fatigue and malaise.   Respiratory:  denies SOB, dyspnea, cough or wheezing  Cardiovascular:  denies chest pain, palpitations, exercise intolerance   GI: denies abdominal pain, diarrhea, constipation  :  denies dysuria, hematuria, increased urinary frequency.  denies abnormal uterine bleeding or vaginal discharge.       Objective:     Vitals:    25 1426   BP: 102/72   Pulse: 100   Weight: 185 lb (83.9 kg)   Height: 63\"         Body mass index is 32.77 kg/m².    General: AAO.NAD.   CV: normal peripheral perfusion   Lungs: no tachypnea, non-labored breathing   Abdominal exam: soft, nontender, nondistended, +BS  Incision: clean, dry and intact. Well healed without signs of infection. Steri-strips removed today.  Pelvic exam: deferred   Ext: non-tender, no edema    Labs:         Assessment:     Sandra Omer is a 39 year old  female who presents for wound check     Patient Active Problem List   Diagnosis    Gestational diabetes mellitus (GDM) controlled on oral hypoglycemic drug, antepartum ([facility])    Status post repeat low transverse  section    Status post bilateral salpingectomy         Plan:     Postpartum wound exam   - s/p RLTCS and BS   - doing well,  no complaints   - no abnormal findings on physical exam   - continue postoperative restrictions including nothing per vagina  and no heavy lifting  GDAM2  - 2hr GTT ordered and to be completed at 6 week pp visit    All of the findings and plan were discussed with the patient.  She notes understanding and agrees with the plan of care.  All questions were answered to the best of my ability at this time.    RTC in 4 weeks for postpartum visit sooner if needed     Adelaide Negron, DO  [facility]       Note to patient and family   The 21st Century Cures Act makes medical notes available to patients in the interest of transparency.  However, please be advised that this is a medical document.  It is intended as [id number] communication.  It is written and medical language may contain abbreviations or verbiage that are technical and unfamiliar.  It may appear blunt or direct.  Medical documents are intended to carry relevant information, facts as evident, and the clinical opinion of the practitioner.

## 2025-01-30 NOTE — PATIENT INSTRUCTIONS
Medications Safe in Pregnancy  The following over-the-counter medications may be taken safely after 12 weeks gestation by any patient who is pregnant.  Please follow the instructions on the package for adult dosage.  If you experience any symptoms prior to 12 weeks, please call the office at [phone].      Colds/Congestion: Flonase, Saline Nasal Spray, Neti Pot, Plain Robitussin, Robitussin DM, Mucinex DM, Vicks 44 E, Vicks Vapor rub, Cough drops without Zinc or Sudafed.   Contact your doctor if you have a persistent fever over 100.4, shortness of breath, coughing up green mucus, or a sore throat that persists from more than 3 days    Diarrhea: Imodium, Maalox Anti-Diarrheal or Kaopectate  Contact the office if you have diarrhea for more than 3 days.    Allergies: Benadryl, Claritin or Zyrtec    Hemorrhoids: Tucks pads, Preparation H, Annusol, Sitz bath or Witch hazel  Eat a high fiber diet and drink plenty of fluids.    Yeast: Monistat 3 or 7  Call if your symptoms do not improve, or if you experience vaginal bleeding, or a watery discharge.    Constipation: Metamucil, Colace, fiber supplement, Milk of Magnesia or Dulcolax  Drink plenty of fluids, eat high-fiber foods and take the above laxatives sporadically.     Headache or Mild aches and pain: Extra Strength Tylenol     Gas: Gas X, Gelusil, Papaya Tablets, Maalox, Mylicon or Mylanta Gas    Heartburn: Tums, Mylanta, Pepcid AC or Maalox    Sore throat: Alcohol free Chloraseptic spray or Lozenges     Nausea and Vomiting: ½ tablet Unisom plus 100mg of Vitamin B6 at bedtime (may increase B6 up to 200mg per day), Rebekah root tea or raspberry leaf tea    Insomnia: Tylenol PM, Vitamin B6 50mg, warm bath or milk, Unisom, Nytol or Sominex.     We have listed a few medications for common symptoms seen in pregnancy.  Please contact the office if you are unsure about any over the counter medications that are not listed above.

## 2025-02-17 DIAGNOSIS — O24.415 GESTATIONAL DIABETES MELLITUS (GDM) CONTROLLED ON ORAL HYPOGLYCEMIC DRUG, ANTEPARTUM (HCC): ICD-10-CM

## 2025-02-17 NOTE — TELEPHONE ENCOUNTER
Last office visit: 2 week postpartum on 1/30/2025  Follow-up recommended: RTC for 6 week postpartum  Last refill date:   Medication Quantity Refills Start End   metFORMIN 500 MG Oral Tab (Discontinued) 90 tablet 1 12/18/2024 1/19/2025   Sig:   Take 1 tablet (500 mg total) by mouth every morning AND 2 tablets (1,000 mg total) every evening. Take 1 tablet by mouth in the morning and 2 tablets by mouth in the evening.     Route:   Oral     Reason for Discontinue:    Stop Taking at Discharge       Next appt.: 2/28/2025  Patient is no longer taking this medication.  Refill denied.

## 2025-02-28 ENCOUNTER — LAB ENCOUNTER (OUTPATIENT)
Dept: LAB | Facility: HOSPITAL | Age: 40
End: 2025-02-28
Attending: STUDENT IN AN ORGANIZED HEALTH CARE EDUCATION/TRAINING PROGRAM
Payer: COMMERCIAL

## 2025-02-28 ENCOUNTER — POSTPARTUM (OUTPATIENT)
Dept: OBGYN CLINIC | Facility: CLINIC | Age: 40
End: 2025-02-28
Payer: COMMERCIAL

## 2025-02-28 VITALS
HEIGHT: 63 IN | BODY MASS INDEX: 32.78 KG/M2 | DIASTOLIC BLOOD PRESSURE: 68 MMHG | SYSTOLIC BLOOD PRESSURE: 112 MMHG | WEIGHT: 185 LBS | HEART RATE: 98 BPM

## 2025-02-28 DIAGNOSIS — Z86.32 HISTORY OF GESTATIONAL DIABETES MELLITUS (GDM): ICD-10-CM

## 2025-02-28 DIAGNOSIS — Z98.891 STATUS POST REPEAT LOW TRANSVERSE CESAREAN SECTION: ICD-10-CM

## 2025-02-28 LAB
GLUCOSE 1H P GLC SERPL-MCNC: 119 MG/DL
GLUCOSE 2H P GLC SERPL-MCNC: 108 MG/DL
GLUCOSE P FAST SERPL-MCNC: 94 MG/DL

## 2025-02-28 PROCEDURE — 82951 GLUCOSE TOLERANCE TEST (GTT): CPT

## 2025-02-28 PROCEDURE — 36415 COLL VENOUS BLD VENIPUNCTURE: CPT

## 2025-02-28 NOTE — PROGRESS NOTES
POSTPARTUM VISIT    S: patient is a 40 year old  who presents today for post partum visit. She underwent repeat LTCS and BS on 25.     Patient Active Problem List   Diagnosis    Gestational diabetes mellitus (GDM) controlled on oral hypoglycemic drug, antepartum (HCC)    Status post repeat low transverse  section    Status post bilateral salpingectomy        She is doing well, bonding with baby Aguila. Breastfeeding, some nipple pain on right just with latch. Bleeding stopped.  Mood: good. Denies SI/HI.  Depression Scale Total: 0 (2025 11:22 AM)      Review of Systems   Constitutional: Negative for activity change, appetite change, chills, fever and unexpected weight change.   HENT: Negative for congestion.    Respiratory: Negative for shortness of breath and wheezing.    Cardiovascular: Negative for chest pain.   Breast: denies pain or skin changes  Gastrointestinal: Negative for vomiting, abdominal pain and abdominal distention.   Genitourinary: Negative for dysuria, frequency, hematuria, vaginal discharge, difficulty urinating, genital sores, vaginal pain, pelvic pain.   Skin: Negative for wound.   Neurological: Negative for dizziness, seizures, numbness and headaches.   Psychiatric/Behavioral: Negative for suicidal ideas.       O:   Vitals:    25 1117   BP: 112/68   Pulse: 98   Weight: 185 lb (83.9 kg)   Height: 63\"       Body mass index is 32.77 kg/m².     General:  NAD, AAOx3   Abdomen: soft, nontender, nondistended. Incision healing well without erythema or exudate.  Ext: nontender, no edema  GYNE/: deferred              A/P:  -Postpartum depression screen reassuring  -Contraception: s/p BS  -Pap smear: neg   -Co-morbidities:   - GDMA1 - completed 2hr GTT today, results pending  - encouraged to see lactation consultant    Follow up with annual exam or sooner prn.    Luiza Lambert MD  [facility]  2025 11:29 AM

## (undated) DEVICE — LARGE, DISPOSABLE ALEXIS O C-SECTION PROTECTOR - RETRACTOR: Brand: ALEXIS ® O C-SECTION PROTECTOR - RETRACTOR

## (undated) DEVICE — LIGASURE EXACT DISSECTOR: Brand: LIGASURE

## (undated) NOTE — Clinical Note
Thank you for the referral! Patient seen today for GDM education. Please refer to note. I will see patient in 2 weeks for follow up, review food choices/diet, and review blood glucose readings.

## (undated) NOTE — Clinical Note
Patient seen today for GDM education follow up. Please refer to note. No further follow up indicated at this unless change in treatment plan. Thank you for the referral and your time.  - Akiko Mendez RD, , LD, CDCES